# Patient Record
Sex: FEMALE | Race: WHITE | NOT HISPANIC OR LATINO
[De-identification: names, ages, dates, MRNs, and addresses within clinical notes are randomized per-mention and may not be internally consistent; named-entity substitution may affect disease eponyms.]

---

## 2021-03-15 ENCOUNTER — TRANSCRIPTION ENCOUNTER (OUTPATIENT)
Age: 31
End: 2021-03-15

## 2021-03-19 ENCOUNTER — TRANSCRIPTION ENCOUNTER (OUTPATIENT)
Age: 31
End: 2021-03-19

## 2023-07-13 ENCOUNTER — TRANSCRIPTION ENCOUNTER (OUTPATIENT)
Age: 33
End: 2023-07-13

## 2023-07-13 ENCOUNTER — INPATIENT (INPATIENT)
Facility: HOSPITAL | Age: 33
LOS: 11 days | Discharge: HOME | End: 2023-07-25
Attending: OBSTETRICS & GYNECOLOGY | Admitting: OBSTETRICS & GYNECOLOGY
Payer: COMMERCIAL

## 2023-07-13 VITALS — WEIGHT: 225.09 LBS | HEIGHT: 65 IN

## 2023-07-13 DIAGNOSIS — Z3A.00 WEEKS OF GESTATION OF PREGNANCY NOT SPECIFIED: ICD-10-CM

## 2023-07-13 DIAGNOSIS — O26.899 OTHER SPECIFIED PREGNANCY RELATED CONDITIONS, UNSPECIFIED TRIMESTER: ICD-10-CM

## 2023-07-13 LAB
ALBUMIN SERPL ELPH-MCNC: 3.6 G/DL — SIGNIFICANT CHANGE UP (ref 3.3–5)
ALP SERPL-CCNC: 95 U/L — SIGNIFICANT CHANGE UP (ref 40–120)
ALT FLD-CCNC: 17 U/L — SIGNIFICANT CHANGE UP (ref 10–45)
ANION GAP SERPL CALC-SCNC: 12 MMOL/L — SIGNIFICANT CHANGE UP (ref 5–17)
APTT BLD: 25.6 SEC — LOW (ref 27.5–35.5)
AST SERPL-CCNC: 15 U/L — SIGNIFICANT CHANGE UP (ref 10–40)
BASOPHILS # BLD AUTO: 0.04 K/UL — SIGNIFICANT CHANGE UP (ref 0–0.2)
BASOPHILS NFR BLD AUTO: 0.4 % — SIGNIFICANT CHANGE UP (ref 0–2)
BILIRUB SERPL-MCNC: 0.2 MG/DL — SIGNIFICANT CHANGE UP (ref 0.2–1.2)
BLD GP AB SCN SERPL QL: NEGATIVE — SIGNIFICANT CHANGE UP
BUN SERPL-MCNC: 15 MG/DL — SIGNIFICANT CHANGE UP (ref 7–23)
CALCIUM SERPL-MCNC: 8.9 MG/DL — SIGNIFICANT CHANGE UP (ref 8.4–10.5)
CHLORIDE SERPL-SCNC: 107 MMOL/L — SIGNIFICANT CHANGE UP (ref 96–108)
CO2 SERPL-SCNC: 20 MMOL/L — LOW (ref 22–31)
CREAT ?TM UR-MCNC: 141 MG/DL — SIGNIFICANT CHANGE UP
CREAT SERPL-MCNC: 0.54 MG/DL — SIGNIFICANT CHANGE UP (ref 0.5–1.3)
EGFR: 125 ML/MIN/1.73M2 — SIGNIFICANT CHANGE UP
EOSINOPHIL # BLD AUTO: 0.06 K/UL — SIGNIFICANT CHANGE UP (ref 0–0.5)
EOSINOPHIL NFR BLD AUTO: 0.5 % — SIGNIFICANT CHANGE UP (ref 0–6)
FIBRINOGEN PPP-MCNC: 502 MG/DL — HIGH (ref 200–445)
GLUCOSE SERPL-MCNC: 84 MG/DL — SIGNIFICANT CHANGE UP (ref 70–99)
HCT VFR BLD CALC: 35.4 % — SIGNIFICANT CHANGE UP (ref 34.5–45)
HGB BLD-MCNC: 11.9 G/DL — SIGNIFICANT CHANGE UP (ref 11.5–15.5)
IMM GRANULOCYTES NFR BLD AUTO: 0.7 % — SIGNIFICANT CHANGE UP (ref 0–0.9)
INR BLD: 0.88 — SIGNIFICANT CHANGE UP (ref 0.88–1.16)
LDH SERPL L TO P-CCNC: 174 U/L — SIGNIFICANT CHANGE UP (ref 50–242)
LYMPHOCYTES # BLD AUTO: 1.41 K/UL — SIGNIFICANT CHANGE UP (ref 1–3.3)
LYMPHOCYTES # BLD AUTO: 12.5 % — LOW (ref 13–44)
MCHC RBC-ENTMCNC: 29.7 PG — SIGNIFICANT CHANGE UP (ref 27–34)
MCHC RBC-ENTMCNC: 33.6 GM/DL — SIGNIFICANT CHANGE UP (ref 32–36)
MCV RBC AUTO: 88.3 FL — SIGNIFICANT CHANGE UP (ref 80–100)
MONOCYTES # BLD AUTO: 0.66 K/UL — SIGNIFICANT CHANGE UP (ref 0–0.9)
MONOCYTES NFR BLD AUTO: 5.9 % — SIGNIFICANT CHANGE UP (ref 2–14)
NEUTROPHILS # BLD AUTO: 9.02 K/UL — HIGH (ref 1.8–7.4)
NEUTROPHILS NFR BLD AUTO: 80 % — HIGH (ref 43–77)
NRBC # BLD: 0 /100 WBCS — SIGNIFICANT CHANGE UP (ref 0–0)
PLATELET # BLD AUTO: 225 K/UL — SIGNIFICANT CHANGE UP (ref 150–400)
POTASSIUM SERPL-MCNC: 4 MMOL/L — SIGNIFICANT CHANGE UP (ref 3.5–5.3)
POTASSIUM SERPL-SCNC: 4 MMOL/L — SIGNIFICANT CHANGE UP (ref 3.5–5.3)
PROT ?TM UR-MCNC: 20 MG/DL — HIGH (ref 0–12)
PROT SERPL-MCNC: 6.9 G/DL — SIGNIFICANT CHANGE UP (ref 6–8.3)
PROT/CREAT UR-RTO: 0.1 RATIO — SIGNIFICANT CHANGE UP (ref 0–0.2)
PROTHROM AB SERPL-ACNC: 10.5 SEC — SIGNIFICANT CHANGE UP (ref 10.5–13.4)
RBC # BLD: 4.01 M/UL — SIGNIFICANT CHANGE UP (ref 3.8–5.2)
RBC # FLD: 13.4 % — SIGNIFICANT CHANGE UP (ref 10.3–14.5)
RH IG SCN BLD-IMP: POSITIVE — SIGNIFICANT CHANGE UP
RH IG SCN BLD-IMP: POSITIVE — SIGNIFICANT CHANGE UP
SODIUM SERPL-SCNC: 139 MMOL/L — SIGNIFICANT CHANGE UP (ref 135–145)
URATE SERPL-MCNC: 4.8 MG/DL — SIGNIFICANT CHANGE UP (ref 2.5–7)
WBC # BLD: 11.27 K/UL — HIGH (ref 3.8–10.5)
WBC # FLD AUTO: 11.27 K/UL — HIGH (ref 3.8–10.5)

## 2023-07-13 RX ORDER — OXYTOCIN 10 UNIT/ML
2 VIAL (ML) INJECTION
Qty: 30 | Refills: 0 | Status: DISCONTINUED | OUTPATIENT
Start: 2023-07-13 | End: 2023-07-14

## 2023-07-13 RX ORDER — CITRIC ACID/SODIUM CITRATE 300-500 MG
15 SOLUTION, ORAL ORAL EVERY 6 HOURS
Refills: 0 | Status: DISCONTINUED | OUTPATIENT
Start: 2023-07-13 | End: 2023-07-14

## 2023-07-13 RX ORDER — CHLORHEXIDINE GLUCONATE 213 G/1000ML
1 SOLUTION TOPICAL DAILY
Refills: 0 | Status: DISCONTINUED | OUTPATIENT
Start: 2023-07-13 | End: 2023-07-14

## 2023-07-13 RX ORDER — OXYTOCIN 10 UNIT/ML
333.33 VIAL (ML) INJECTION
Qty: 20 | Refills: 0 | Status: DISCONTINUED | OUTPATIENT
Start: 2023-07-13 | End: 2023-07-14

## 2023-07-13 RX ORDER — SODIUM CHLORIDE 9 MG/ML
1000 INJECTION, SOLUTION INTRAVENOUS
Refills: 0 | Status: DISCONTINUED | OUTPATIENT
Start: 2023-07-13 | End: 2023-07-14

## 2023-07-13 RX ORDER — FENTANYL/BUPIVACAINE/NS/PF 2MCG/ML-.1
250 PLASTIC BAG, INJECTION (ML) INJECTION
Refills: 0 | Status: DISCONTINUED | OUTPATIENT
Start: 2023-07-13 | End: 2023-07-14

## 2023-07-13 RX ADMIN — SODIUM CHLORIDE 125 MILLILITER(S): 9 INJECTION, SOLUTION INTRAVENOUS at 23:00

## 2023-07-13 RX ADMIN — Medication 2 MILLIUNIT(S)/MIN: at 19:00

## 2023-07-13 RX ADMIN — SODIUM CHLORIDE 125 MILLILITER(S): 9 INJECTION, SOLUTION INTRAVENOUS at 17:54

## 2023-07-13 NOTE — PATIENT PROFILE OB - FUNCTIONAL SCREEN CURRENT LEVEL: COMMUNICATION, MLM
no abdominal pain, no bloating, no constipation, no diarrhea, no nausea and no vomiting. 0 = understands/communicates without difficulty

## 2023-07-13 NOTE — PRE-ANESTHESIA EVALUATION ADULT - NSANTHPMHFT_GEN_ALL_CORE
METS>4  33yo  at 38+4 with cHTN presenting for elevated BP at home (167/117), r/o superimposed preeclampsia. She had elevated BP (157/101) at 16wks, ruling in for chronic hypertension.

## 2023-07-13 NOTE — PATIENT PROFILE OB - FALL HARM RISK - UNIVERSAL INTERVENTIONS
Bed in lowest position, wheels locked, appropriate side rails in place/Call bell, personal items and telephone in reach/Instruct patient to call for assistance before getting out of bed or chair/Non-slip footwear when patient is out of bed/[place] to call system/Physically safe environment - no spills, clutter or unnecessary equipment/Purposeful Proactive Rounding/Room/bathroom lighting operational, light cord in reach

## 2023-07-13 NOTE — PATIENT PROFILE OB - PHONE #
OK to refill. PDMP reviewed; no aberrant behavior identified, prescription authorized and ePrescribed.     [phone]

## 2023-07-14 LAB — T PALLIDUM AB TITR SER: NEGATIVE — SIGNIFICANT CHANGE UP

## 2023-07-14 PROCEDURE — 88307 TISSUE EXAM BY PATHOLOGIST: CPT | Mod: 26

## 2023-07-14 RX ORDER — SIMETHICONE 80 MG/1
80 TABLET, CHEWABLE ORAL EVERY 4 HOURS
Refills: 0 | Status: DISCONTINUED | OUTPATIENT
Start: 2023-07-14 | End: 2023-07-25

## 2023-07-14 RX ORDER — GENTAMICIN SULFATE 40 MG/ML
380 VIAL (ML) INJECTION ONCE
Refills: 0 | Status: COMPLETED | OUTPATIENT
Start: 2023-07-14 | End: 2023-07-14

## 2023-07-14 RX ORDER — KETOROLAC TROMETHAMINE 30 MG/ML
30 SYRINGE (ML) INJECTION EVERY 6 HOURS
Refills: 0 | Status: DISCONTINUED | OUTPATIENT
Start: 2023-07-14 | End: 2023-07-15

## 2023-07-14 RX ORDER — DIPHENHYDRAMINE HCL 50 MG
25 CAPSULE ORAL EVERY 6 HOURS
Refills: 0 | Status: DISCONTINUED | OUTPATIENT
Start: 2023-07-14 | End: 2023-07-25

## 2023-07-14 RX ORDER — AMPICILLIN TRIHYDRATE 250 MG
2 CAPSULE ORAL EVERY 6 HOURS
Refills: 0 | Status: COMPLETED | OUTPATIENT
Start: 2023-07-14 | End: 2023-07-15

## 2023-07-14 RX ORDER — GENTAMICIN SULFATE 40 MG/ML
380 VIAL (ML) INJECTION ONCE
Refills: 0 | Status: DISCONTINUED | OUTPATIENT
Start: 2023-07-14 | End: 2023-07-14

## 2023-07-14 RX ORDER — MAGNESIUM HYDROXIDE 400 MG/1
30 TABLET, CHEWABLE ORAL
Refills: 0 | Status: DISCONTINUED | OUTPATIENT
Start: 2023-07-14 | End: 2023-07-25

## 2023-07-14 RX ORDER — AMPICILLIN TRIHYDRATE 250 MG
2 CAPSULE ORAL EVERY 6 HOURS
Refills: 0 | Status: DISCONTINUED | OUTPATIENT
Start: 2023-07-14 | End: 2023-07-14

## 2023-07-14 RX ORDER — OXYCODONE HYDROCHLORIDE 5 MG/1
5 TABLET ORAL ONCE
Refills: 0 | Status: DISCONTINUED | OUTPATIENT
Start: 2023-07-14 | End: 2023-07-21

## 2023-07-14 RX ORDER — LANOLIN
1 OINTMENT (GRAM) TOPICAL EVERY 6 HOURS
Refills: 0 | Status: DISCONTINUED | OUTPATIENT
Start: 2023-07-14 | End: 2023-07-25

## 2023-07-14 RX ORDER — ACETAMINOPHEN 500 MG
975 TABLET ORAL
Refills: 0 | Status: DISCONTINUED | OUTPATIENT
Start: 2023-07-14 | End: 2023-07-25

## 2023-07-14 RX ORDER — IBUPROFEN 200 MG
600 TABLET ORAL EVERY 6 HOURS
Refills: 0 | Status: COMPLETED | OUTPATIENT
Start: 2023-07-14 | End: 2024-06-11

## 2023-07-14 RX ORDER — TETANUS TOXOID, REDUCED DIPHTHERIA TOXOID AND ACELLULAR PERTUSSIS VACCINE, ADSORBED 5; 2.5; 8; 8; 2.5 [IU]/.5ML; [IU]/.5ML; UG/.5ML; UG/.5ML; UG/.5ML
0.5 SUSPENSION INTRAMUSCULAR ONCE
Refills: 0 | Status: COMPLETED | OUTPATIENT
Start: 2023-07-14

## 2023-07-14 RX ORDER — OXYCODONE HYDROCHLORIDE 5 MG/1
5 TABLET ORAL
Refills: 0 | Status: DISCONTINUED | OUTPATIENT
Start: 2023-07-14 | End: 2023-07-21

## 2023-07-14 RX ORDER — ENOXAPARIN SODIUM 100 MG/ML
40 INJECTION SUBCUTANEOUS EVERY 24 HOURS
Refills: 0 | Status: DISCONTINUED | OUTPATIENT
Start: 2023-07-15 | End: 2023-07-25

## 2023-07-14 RX ORDER — CITRIC ACID/SODIUM CITRATE 300-500 MG
30 SOLUTION, ORAL ORAL ONCE
Refills: 0 | Status: DISCONTINUED | OUTPATIENT
Start: 2023-07-14 | End: 2023-07-14

## 2023-07-14 RX ORDER — ACETAMINOPHEN 500 MG
1000 TABLET ORAL ONCE
Refills: 0 | Status: COMPLETED | OUTPATIENT
Start: 2023-07-14 | End: 2023-07-14

## 2023-07-14 RX ORDER — SODIUM CHLORIDE 9 MG/ML
1000 INJECTION, SOLUTION INTRAVENOUS
Refills: 0 | Status: DISCONTINUED | OUTPATIENT
Start: 2023-07-14 | End: 2023-07-24

## 2023-07-14 RX ORDER — OXYTOCIN 10 UNIT/ML
333.33 VIAL (ML) INJECTION
Qty: 20 | Refills: 0 | Status: DISCONTINUED | OUTPATIENT
Start: 2023-07-14 | End: 2023-07-25

## 2023-07-14 RX ORDER — SODIUM CHLORIDE 9 MG/ML
500 INJECTION, SOLUTION INTRAVENOUS ONCE
Refills: 0 | Status: COMPLETED | OUTPATIENT
Start: 2023-07-14 | End: 2023-07-14

## 2023-07-14 RX ADMIN — Medication 100 MILLIGRAM(S): at 23:48

## 2023-07-14 RX ADMIN — Medication 1000 MILLIGRAM(S): at 15:22

## 2023-07-14 RX ADMIN — Medication 30 MILLIGRAM(S): at 20:17

## 2023-07-14 RX ADMIN — Medication 200 MILLIGRAM(S): at 17:40

## 2023-07-14 RX ADMIN — Medication 30 MILLIGRAM(S): at 19:26

## 2023-07-14 RX ADMIN — Medication 400 MILLIGRAM(S): at 14:37

## 2023-07-14 RX ADMIN — Medication 975 MILLIGRAM(S): at 21:20

## 2023-07-14 RX ADMIN — SODIUM CHLORIDE 125 MILLILITER(S): 9 INJECTION, SOLUTION INTRAVENOUS at 07:48

## 2023-07-14 RX ADMIN — Medication 975 MILLIGRAM(S): at 22:00

## 2023-07-14 RX ADMIN — SODIUM CHLORIDE 1000 MILLILITER(S): 9 INJECTION, SOLUTION INTRAVENOUS at 21:25

## 2023-07-14 RX ADMIN — Medication 100 MILLIGRAM(S): at 17:50

## 2023-07-14 RX ADMIN — Medication 216 GRAM(S): at 17:00

## 2023-07-15 LAB
BASOPHILS # BLD AUTO: 0.05 K/UL — SIGNIFICANT CHANGE UP (ref 0–0.2)
BASOPHILS NFR BLD AUTO: 0.3 % — SIGNIFICANT CHANGE UP (ref 0–2)
EOSINOPHIL # BLD AUTO: 0.1 K/UL — SIGNIFICANT CHANGE UP (ref 0–0.5)
EOSINOPHIL NFR BLD AUTO: 0.6 % — SIGNIFICANT CHANGE UP (ref 0–6)
HCT VFR BLD CALC: 28 % — LOW (ref 34.5–45)
HGB BLD-MCNC: 9.6 G/DL — LOW (ref 11.5–15.5)
IMM GRANULOCYTES NFR BLD AUTO: 0.5 % — SIGNIFICANT CHANGE UP (ref 0–0.9)
LYMPHOCYTES # BLD AUTO: 1.65 K/UL — SIGNIFICANT CHANGE UP (ref 1–3.3)
LYMPHOCYTES # BLD AUTO: 10.6 % — LOW (ref 13–44)
MCHC RBC-ENTMCNC: 31 PG — SIGNIFICANT CHANGE UP (ref 27–34)
MCHC RBC-ENTMCNC: 34.3 GM/DL — SIGNIFICANT CHANGE UP (ref 32–36)
MCV RBC AUTO: 90.3 FL — SIGNIFICANT CHANGE UP (ref 80–100)
MONOCYTES # BLD AUTO: 0.97 K/UL — HIGH (ref 0–0.9)
MONOCYTES NFR BLD AUTO: 6.2 % — SIGNIFICANT CHANGE UP (ref 2–14)
NEUTROPHILS # BLD AUTO: 12.71 K/UL — HIGH (ref 1.8–7.4)
NEUTROPHILS NFR BLD AUTO: 81.8 % — HIGH (ref 43–77)
NRBC # BLD: 0 /100 WBCS — SIGNIFICANT CHANGE UP (ref 0–0)
PLATELET # BLD AUTO: 162 K/UL — SIGNIFICANT CHANGE UP (ref 150–400)
RBC # BLD: 3.1 M/UL — LOW (ref 3.8–5.2)
RBC # FLD: 13.6 % — SIGNIFICANT CHANGE UP (ref 10.3–14.5)
WBC # BLD: 15.55 K/UL — HIGH (ref 3.8–10.5)
WBC # FLD AUTO: 15.55 K/UL — HIGH (ref 3.8–10.5)

## 2023-07-15 RX ORDER — IBUPROFEN 200 MG
600 TABLET ORAL EVERY 6 HOURS
Refills: 0 | Status: DISCONTINUED | OUTPATIENT
Start: 2023-07-15 | End: 2023-07-25

## 2023-07-15 RX ADMIN — Medication 30 MILLIGRAM(S): at 12:55

## 2023-07-15 RX ADMIN — Medication 30 MILLIGRAM(S): at 00:10

## 2023-07-15 RX ADMIN — Medication 975 MILLIGRAM(S): at 20:31

## 2023-07-15 RX ADMIN — Medication 975 MILLIGRAM(S): at 14:54

## 2023-07-15 RX ADMIN — Medication 30 MILLIGRAM(S): at 06:20

## 2023-07-15 RX ADMIN — Medication 975 MILLIGRAM(S): at 10:35

## 2023-07-15 RX ADMIN — Medication 100 MILLIGRAM(S): at 06:10

## 2023-07-15 RX ADMIN — ENOXAPARIN SODIUM 40 MILLIGRAM(S): 100 INJECTION SUBCUTANEOUS at 09:56

## 2023-07-15 RX ADMIN — Medication 216 GRAM(S): at 12:54

## 2023-07-15 RX ADMIN — Medication 30 MILLIGRAM(S): at 05:48

## 2023-07-15 RX ADMIN — Medication 216 GRAM(S): at 20:02

## 2023-07-15 RX ADMIN — Medication 216 GRAM(S): at 01:03

## 2023-07-15 RX ADMIN — Medication 975 MILLIGRAM(S): at 15:45

## 2023-07-15 RX ADMIN — Medication 30 MILLIGRAM(S): at 13:30

## 2023-07-15 RX ADMIN — Medication 975 MILLIGRAM(S): at 09:56

## 2023-07-15 RX ADMIN — Medication 975 MILLIGRAM(S): at 03:32

## 2023-07-15 RX ADMIN — Medication 30 MILLIGRAM(S): at 00:30

## 2023-07-15 RX ADMIN — Medication 600 MILLIGRAM(S): at 18:30

## 2023-07-15 RX ADMIN — Medication 975 MILLIGRAM(S): at 03:02

## 2023-07-15 RX ADMIN — SIMETHICONE 80 MILLIGRAM(S): 80 TABLET, CHEWABLE ORAL at 15:17

## 2023-07-15 RX ADMIN — Medication 600 MILLIGRAM(S): at 17:56

## 2023-07-15 RX ADMIN — Medication 975 MILLIGRAM(S): at 21:00

## 2023-07-15 RX ADMIN — Medication 216 GRAM(S): at 07:24

## 2023-07-15 RX ADMIN — Medication 100 MILLIGRAM(S): at 14:54

## 2023-07-15 NOTE — PROGRESS NOTE ADULT - ASSESSMENT
32y Female POD#1  s/p C/S, Uncomplicated                                       - Neuro/Pain:  toradol atc, tylenol atc, oxy prn  - CV:  VS per routine, AM CBC pending  - Pulm: Encourage ISS & Ambulation  - GI: Advance as tolerated  - : Lazo in place, to be removed this morning, TOV this afternoon  - DVT ppx: SCDs, Lovenox 40mg QD  - Dispo: POD #2/3

## 2023-07-15 NOTE — PROGRESS NOTE ADULT - SUBJECTIVE AND OBJECTIVE BOX
Patient evaluated at bedside this morning, resting comfortable in bed, with no acute events overnight.  She reports pain is well controlled with oral pain medications.   She denies headache, dizziness, chest pain, palpitations, shortness of breath, nausea, vomiting or heavy vaginal bleeding.  She has not tried ambulating since procedure, hdez remains in place at this time. Tolerating clear liquids.     Physical Exam:  Vital Signs Last 24 Hrs  T(C): 36.9 (15 Jul 2023 05:59), Max: 37.3 (14 Jul 2023 20:17)  T(F): 98.5 (15 Jul 2023 05:59), Max: 99.1 (14 Jul 2023 20:17)  HR: 82 (15 Jul 2023 05:59) (76 - 104)  BP: 120/85 (15 Jul 2023 05:59) (102/51 - 141/90)  BP(mean): 98 (14 Jul 2023 22:17) (73 - 98)  RR: 16 (15 Jul 2023 05:59) (15 - 18)  SpO2: 97% (15 Jul 2023 05:59) (94% - 100%)    Parameters below as of 15 Jul 2023 02:00  Patient On (Oxygen Delivery Method): room air        GA: NAD, A+0 x 3  Pulm: no increased WOB  Abd: soft, nontender, nondistended, no rebound or guarding, incision clean, dry and intact, uterus firm at midline, 2 fb below umbilicus  : hdez in situ, lochia WNL  Extremities: no swelling or calf tenderness, SCDs in place                            9.6    15.55 )-----------( 162      ( 15 Jul 2023 05:30 )             28.0     07-13    139  |  107  |  15  ----------------------------<  84  4.0   |  20<L>  |  0.54    Ca    8.9      13 Jul 2023 14:34    TPro  6.9  /  Alb  3.6  /  TBili  0.2  /  DBili  x   /  AST  15  /  ALT  17  /  AlkPhos  95  07-13      PT/INR - ( 13 Jul 2023 14:34 )   PT: 10.5 sec;   INR: 0.88          PTT - ( 13 Jul 2023 14:34 )  PTT:25.6 sec  acetaminophen     Tablet .. 975 milliGRAM(s) Oral <User Schedule>  ampicillin  IVPB 2 Gram(s) IV Intermittent every 6 hours  clindamycin IVPB 900 milliGRAM(s) IV Intermittent every 8 hours  diphenhydrAMINE 25 milliGRAM(s) Oral every 6 hours PRN  diphtheria/tetanus/pertussis (acellular) Vaccine (Adacel) 0.5 milliLiter(s) IntraMuscular once  enoxaparin Injectable 40 milliGRAM(s) SubCutaneous every 24 hours  ibuprofen  Tablet. 600 milliGRAM(s) Oral every 6 hours  ketorolac   Injectable 30 milliGRAM(s) IV Push every 6 hours  lactated ringers. 1000 milliLiter(s) IV Continuous <Continuous>  lanolin Ointment 1 Application(s) Topical every 6 hours PRN  magnesium hydroxide Suspension 30 milliLiter(s) Oral two times a day PRN  oxyCODONE    IR 5 milliGRAM(s) Oral every 3 hours PRN  oxyCODONE    IR 5 milliGRAM(s) Oral once PRN  oxytocin Infusion 333.333 milliUNIT(s)/Min IV Continuous <Continuous>  simethicone 80 milliGRAM(s) Chew every 4 hours PRN

## 2023-07-16 RX ORDER — NIFEDIPINE 30 MG
30 TABLET, EXTENDED RELEASE 24 HR ORAL DAILY
Refills: 0 | Status: DISCONTINUED | OUTPATIENT
Start: 2023-07-16 | End: 2023-07-17

## 2023-07-16 RX ADMIN — SIMETHICONE 80 MILLIGRAM(S): 80 TABLET, CHEWABLE ORAL at 09:34

## 2023-07-16 RX ADMIN — MAGNESIUM HYDROXIDE 30 MILLILITER(S): 400 TABLET, CHEWABLE ORAL at 15:19

## 2023-07-16 RX ADMIN — Medication 975 MILLIGRAM(S): at 16:10

## 2023-07-16 RX ADMIN — Medication 600 MILLIGRAM(S): at 06:35

## 2023-07-16 RX ADMIN — Medication 600 MILLIGRAM(S): at 18:13

## 2023-07-16 RX ADMIN — Medication 600 MILLIGRAM(S): at 13:11

## 2023-07-16 RX ADMIN — SIMETHICONE 80 MILLIGRAM(S): 80 TABLET, CHEWABLE ORAL at 15:19

## 2023-07-16 RX ADMIN — Medication 600 MILLIGRAM(S): at 19:02

## 2023-07-16 RX ADMIN — Medication 975 MILLIGRAM(S): at 15:19

## 2023-07-16 RX ADMIN — Medication 975 MILLIGRAM(S): at 10:30

## 2023-07-16 RX ADMIN — SIMETHICONE 80 MILLIGRAM(S): 80 TABLET, CHEWABLE ORAL at 20:47

## 2023-07-16 RX ADMIN — Medication 975 MILLIGRAM(S): at 02:42

## 2023-07-16 RX ADMIN — Medication 975 MILLIGRAM(S): at 09:34

## 2023-07-16 RX ADMIN — Medication 975 MILLIGRAM(S): at 03:12

## 2023-07-16 RX ADMIN — Medication 600 MILLIGRAM(S): at 12:17

## 2023-07-16 RX ADMIN — Medication 600 MILLIGRAM(S): at 00:30

## 2023-07-16 RX ADMIN — SIMETHICONE 80 MILLIGRAM(S): 80 TABLET, CHEWABLE ORAL at 00:00

## 2023-07-16 RX ADMIN — ENOXAPARIN SODIUM 40 MILLIGRAM(S): 100 INJECTION SUBCUTANEOUS at 09:34

## 2023-07-16 RX ADMIN — Medication 600 MILLIGRAM(S): at 23:48

## 2023-07-16 RX ADMIN — Medication 600 MILLIGRAM(S): at 06:04

## 2023-07-16 RX ADMIN — Medication 975 MILLIGRAM(S): at 21:20

## 2023-07-16 RX ADMIN — Medication 600 MILLIGRAM(S): at 00:00

## 2023-07-16 RX ADMIN — Medication 975 MILLIGRAM(S): at 20:47

## 2023-07-16 RX ADMIN — Medication 30 MILLIGRAM(S): at 18:13

## 2023-07-16 NOTE — PROGRESS NOTE ADULT - SUBJECTIVE AND OBJECTIVE BOX
Patient evaluated at bedside.   She reports pain is well controlled with OPM.  She has been ambulating without assistance, voiding spontaneously, passing gas, tolerating regular diet.  She denies HA, dizziness, CP, palpitations, SOB, RUQ/epigastric pain, n/v, or heavy vaginal bleeding.    Physical Exam:  Vital Signs Last 24 Hrs  T(C): 36.9 (16 Jul 2023 02:26), Max: 36.9 (15 Jul 2023 05:59)  T(F): 98.4 (16 Jul 2023 02:26), Max: 98.5 (15 Jul 2023 05:59)  HR: 81 (16 Jul 2023 02:26) (81 - 93)  BP: 130/84 (16 Jul 2023 02:26) (110/70 - 132/86)  BP(mean): --  RR: 18 (16 Jul 2023 02:26) (16 - 18)  SpO2: 99% (16 Jul 2023 02:26) (97% - 99%)    Parameters below as of 16 Jul 2023 02:26  Patient On (Oxygen Delivery Method): room air        Gen: NAD  Abd: + BS, soft, nontender, nondistended, no rebound or guarding  Incision clean, dry and intact  uterus firm at midline  No RUQ/epigastric tenderness  : lochia WNL  Extremities: no swelling or calf tenderness                          9.6    15.55 )-----------( 162      ( 15 Jul 2023 05:30 )             28.0

## 2023-07-16 NOTE — LACTATION INITIAL EVALUATION - NS LACT CON REASON FOR REQ
38 wk baby seen on dol#2, s/p brief nicu stay, voiding and stooling, mainly formula fed thus far. Mother wishes to breastfeed and reports a recent 15min feed prior to my visit to room. Baby alert and rooting and placed back on breast. Positioning and latch strategies taught using cradle hold. Baby attains a deep, sustained latch, but quickly became sleepy, taking short suck bursts between rest pauses with stim. Pump at bedside and instructions for use taught if pump needed and/ or bottle given for any reason. Complete breastfeeding education provided along with hand expression technique. Colostrum expressible in small amounts bilat. Responsive frequent feeds, increased SSC and rooming-in all advised. All questions were answered, mother verbalized understanding of teaching and info given. Referral for lactation telehealth was placed for further support s/p d/c./primaparous mom

## 2023-07-16 NOTE — CHART NOTE - NSCHARTNOTEFT_GEN_A_CORE
Nurse called for increased bleeding at incision site and requested evaluation. Patient was seen and examined at bedside, breastfeeding, in stable condition. She denies chest pain, palpitations, lightheadedness, abdominal pain.     PE  vitals: 154/87, HR 85, spo2 97%, temp 98.5  gen: well appearing, breastfeeding, in NAD  abd: soft, minimally tender. Incision with steristrips in place: clean/dry/ intact with one steri strip 50% saturated with blood.   : minimal blood on pad  extrem: minimal LE edema     A&P: On physical exam there is no active bleeding at incision site, only one steri strip saturated with blood. Vitals stable, minimal abdominal tenderness, overall reassuring.  - continue to monitor

## 2023-07-16 NOTE — LACTATION INITIAL EVALUATION - LACTATION INTERVENTIONS
initiate/review safe skin-to-skin/initiate/review hand expression/initiate/review pumping guidelines and safe milk handling/initiate/review techniques for position and latch/post discharge community resources provided/reviewed components of an effective feeding and at least 8 effective feedings per day required/reviewed importance of monitoring infant diapers, the breastfeeding log, and minimum output each day/reviewed risks of unnecessary formula supplementation/reviewed risks of artificial nipples/reviewed benefits and recommendations for rooming in/reviewed feeding on demand/by cue at least 8 times a day/reviewed indications of inadequate milk transfer that would require supplementation

## 2023-07-16 NOTE — PROGRESS NOTE ADULT - ASSESSMENT
32y Female POD#2 s/p C/S, c/b cHTN and chorioamnionitis  - cHTN: normotensive overnight, denies toxic complaints    - chorioamnionitis, s/p IV A/G/C                      - Neuro/Pain: motrin atc, tylenol atc, oxy prn  - CV: VSq4h  - Pulm: Encourage ISS & Ambulation  - GI: Advance as tolerated  - : Voiding spontaneously  - DVT ppx: SCDs, Lovenox 40mg QD  - Dispo: POD #2/3

## 2023-07-17 RX ORDER — LABETALOL HCL 100 MG
200 TABLET ORAL
Refills: 0 | Status: DISCONTINUED | OUTPATIENT
Start: 2023-07-17 | End: 2023-07-18

## 2023-07-17 RX ORDER — NIFEDIPINE 30 MG
30 TABLET, EXTENDED RELEASE 24 HR ORAL EVERY 12 HOURS
Refills: 0 | Status: DISCONTINUED | OUTPATIENT
Start: 2023-07-17 | End: 2023-07-18

## 2023-07-17 RX ADMIN — Medication 975 MILLIGRAM(S): at 09:39

## 2023-07-17 RX ADMIN — Medication 30 MILLIGRAM(S): at 17:54

## 2023-07-17 RX ADMIN — Medication 975 MILLIGRAM(S): at 21:20

## 2023-07-17 RX ADMIN — Medication 200 MILLIGRAM(S): at 11:24

## 2023-07-17 RX ADMIN — Medication 600 MILLIGRAM(S): at 17:54

## 2023-07-17 RX ADMIN — Medication 600 MILLIGRAM(S): at 11:57

## 2023-07-17 RX ADMIN — Medication 600 MILLIGRAM(S): at 05:51

## 2023-07-17 RX ADMIN — Medication 975 MILLIGRAM(S): at 22:20

## 2023-07-17 RX ADMIN — Medication 975 MILLIGRAM(S): at 03:35

## 2023-07-17 RX ADMIN — Medication 30 MILLIGRAM(S): at 05:51

## 2023-07-17 RX ADMIN — SIMETHICONE 80 MILLIGRAM(S): 80 TABLET, CHEWABLE ORAL at 11:03

## 2023-07-17 RX ADMIN — Medication 975 MILLIGRAM(S): at 15:15

## 2023-07-17 RX ADMIN — Medication 600 MILLIGRAM(S): at 06:21

## 2023-07-17 RX ADMIN — Medication 600 MILLIGRAM(S): at 00:20

## 2023-07-17 RX ADMIN — Medication 975 MILLIGRAM(S): at 03:02

## 2023-07-17 RX ADMIN — Medication 600 MILLIGRAM(S): at 23:50

## 2023-07-17 RX ADMIN — ENOXAPARIN SODIUM 40 MILLIGRAM(S): 100 INJECTION SUBCUTANEOUS at 11:57

## 2023-07-17 RX ADMIN — Medication 200 MILLIGRAM(S): at 22:26

## 2023-07-17 NOTE — CHART NOTE - NSCHARTNOTEFT_GEN_A_CORE
Nurse alerted team to severe range /104  at 10:30am, repeat 15min later 147/95. She was started on nifedipine 30bid yesterday evening due to persistent mild range blood pressures and received her dose this AM at 6am. Patient was evaluated at bedside. She reports being incredibly anxious seeing that her blood pressure is elevated and feels her heart is racing a little bit due to her anxiety. She denies HA, difficulty breathing, chest pain epigastric/ RUQ pain.     PE:   vitals (above)   gen: anxious-appearing, breastfeeding infant in bed  pulm: no increased WOB, CTAB  CV: tachycardic, regular rate, no murmurs  abd: soft, nontender, fundus firm, mild tenderness over incision site.   extrem: moderate non-pitting edema of LE bilaterally     31yo  POD3 s/p pCS after failed IOL for cHTN. Blood pressure is still persistently mild range now with one severe range, will add labetalol for better BP control.   - continue nifedipine 30mg bid  - start labetalol 200mg bid  - continues to deny toxic symptoms of PEC  - continue to monitor vitals q4hr

## 2023-07-17 NOTE — PROVIDER CONTACT NOTE (OTHER) - BACKGROUND
Pt is s/p  day 3. Pt has hx of GHTN and has been increasing in elevated BP throughout the night. Previous BP was 155/97 for 0200 routine VS. Pt was increased Nifedipine 30 mg po BID.

## 2023-07-17 NOTE — PROGRESS NOTE ADULT - ASSESSMENT
32y Female POD#3 s/p C/S, c/b cHTN and chorioamnionitis  - cHTN: 1x severe range BP overnight, Nifedipine 30mg BID started. Denies all toxic complaints.   - Chorioamnionitis: s/p IV A/G/C 7/14-15  - Neuro/Pain: motrin atc, tylenol atc, oxy prn  - CV:  VSq4h  - Pulm: Encourage ISS & Ambulation  - GI: Advance as tolerated  - : Voiding spontaneously  - DVT ppx: SCDs, Lovenox 40mg QD  - Dispo: when BP well controlled

## 2023-07-17 NOTE — PROGRESS NOTE ADULT - SUBJECTIVE AND OBJECTIVE BOX
Patient evaluated at bedside.   She reports pain is well controlled with OPM.  She has been ambulating without assistance, voiding spontaneously, passing gas, tolerating regular diet.  She denies HA, dizziness, CP, palpitations, SOB, RUQ/epigastric pain, n/v, or heavy vaginal bleeding.    Physical Exam:  Vital Signs Last 24 Hrs  T(C): 36.7 (17 Jul 2023 05:37), Max: 36.9 (16 Jul 2023 10:00)  T(F): 98.1 (17 Jul 2023 05:37), Max: 98.4 (16 Jul 2023 10:00)  HR: 103 (17 Jul 2023 05:52) (81 - 103)  BP: 155/96 (17 Jul 2023 05:52) (138/89 - 163/105)  BP(mean): --  RR: 16 (17 Jul 2023 05:52) (16 - 18)  SpO2: 99% (17 Jul 2023 05:52) (97% - 99%)    Parameters below as of 16 Jul 2023 18:48  Patient On (Oxygen Delivery Method): room air        Gen: NAD  Abd: + BS, soft, nontender, nondistended, no rebound or guarding  Incision intact. Small amount of serosanguinous fluid leaked from incision overnight.   uterus firm at midline  No RUQ/epigastric tenderness  : lochia WNL  Extremities: no swelling or calf tenderness

## 2023-07-17 NOTE — CHART NOTE - NSCHARTNOTEFT_GEN_A_CORE
Called to bedside by RN for BP of 163/105. Upon interviewing patient, she was in no acute distress, denied headache, vision changes, shortness of breath, RUQ change. Patient took oral nifedipine 30mg and pressure was taken after 15 minutes. Repeat pressure was 155 systolic. Will continue to monitor pressures. Called to bedside by RN for BP of 163/105. Upon interviewing patient, she was in no acute distress, denied headache, vision changes, shortness of breath, RUQ change. Patient took oral nifedipine 30mg and pressure was taken after 15 minutes. Repeat pressure was 155/96. Will continue to monitor pressures.

## 2023-07-18 ENCOUNTER — APPOINTMENT (OUTPATIENT)
Dept: ANTEPARTUM | Facility: CLINIC | Age: 33
End: 2023-07-18

## 2023-07-18 ENCOUNTER — TRANSCRIPTION ENCOUNTER (OUTPATIENT)
Age: 33
End: 2023-07-18

## 2023-07-18 RX ORDER — LABETALOL HCL 100 MG
300 TABLET ORAL THREE TIMES A DAY
Refills: 0 | Status: DISCONTINUED | OUTPATIENT
Start: 2023-07-18 | End: 2023-07-19

## 2023-07-18 RX ORDER — ZINC OXIDE 200 MG/G
1 OINTMENT TOPICAL DAILY
Refills: 0 | Status: DISCONTINUED | OUTPATIENT
Start: 2023-07-18 | End: 2023-07-25

## 2023-07-18 RX ADMIN — Medication 600 MILLIGRAM(S): at 13:40

## 2023-07-18 RX ADMIN — Medication 975 MILLIGRAM(S): at 16:24

## 2023-07-18 RX ADMIN — ENOXAPARIN SODIUM 40 MILLIGRAM(S): 100 INJECTION SUBCUTANEOUS at 09:39

## 2023-07-18 RX ADMIN — Medication 975 MILLIGRAM(S): at 10:35

## 2023-07-18 RX ADMIN — Medication 5 MILLIGRAM(S): at 19:10

## 2023-07-18 RX ADMIN — Medication 600 MILLIGRAM(S): at 19:00

## 2023-07-18 RX ADMIN — Medication 975 MILLIGRAM(S): at 09:39

## 2023-07-18 RX ADMIN — Medication 300 MILLIGRAM(S): at 07:41

## 2023-07-18 RX ADMIN — Medication 1 APPLICATION(S): at 12:46

## 2023-07-18 RX ADMIN — Medication 600 MILLIGRAM(S): at 12:46

## 2023-07-18 RX ADMIN — Medication 975 MILLIGRAM(S): at 15:31

## 2023-07-18 RX ADMIN — Medication 300 MILLIGRAM(S): at 15:31

## 2023-07-18 RX ADMIN — ZINC OXIDE 1 APPLICATION(S): 200 OINTMENT TOPICAL at 12:46

## 2023-07-18 RX ADMIN — Medication 600 MILLIGRAM(S): at 18:03

## 2023-07-18 RX ADMIN — Medication 975 MILLIGRAM(S): at 22:00

## 2023-07-18 RX ADMIN — Medication 300 MILLIGRAM(S): at 22:25

## 2023-07-18 RX ADMIN — Medication 600 MILLIGRAM(S): at 05:56

## 2023-07-18 RX ADMIN — Medication 30 MILLIGRAM(S): at 05:55

## 2023-07-18 RX ADMIN — Medication 975 MILLIGRAM(S): at 20:42

## 2023-07-18 NOTE — DISCHARGE NOTE OB - PATIENT PORTAL LINK FT
You can access the FollowMyHealth Patient Portal offered by [facility] by registering at the following website: [email or web address]. By joining [facility]’s FollowMyHealth portal, you will also be able to view your health information using other applications (apps) compatible with our system.

## 2023-07-18 NOTE — DISCHARGE NOTE OB - HOSPITAL COURSE
Patient underwent an uncomplicated primary LTCS for failed IOL for cHTN. She was diagnosed w/ chorioamnionitis and received 24hr IV antibiotics. She was also diagnosed w/ PEC w/ SF due to BP. Cardiology was consulted and BP are currently well controlled on labetalol 500mg TID, enalapril 10mg QD, and nifedipine 30mg QD. Patient’s postoperative course was unremarkable and she remained hemodynamically stable and afebrile throughout. Upon discharge the patient is ambulating without assistance, voiding spontaneously, tolerating oral intake. Pain is well controlled with oral medication and vital signs are stable.

## 2023-07-18 NOTE — PROGRESS NOTE ADULT - SUBJECTIVE AND OBJECTIVE BOX
Patient evaluated at bedside.   She reports pain is well controlled with OPM.  She has been ambulating without assistance, voiding spontaneously, passing gas, tolerating regular diet.  She denies HA, dizziness, CP, palpitations, SOB, RUQ/epigastric pain, n/v, or heavy vaginal bleeding.    Physical Exam:  Vital Signs Last 24 Hrs  T(C): 36.8 (18 Jul 2023 05:58), Max: 36.8 (17 Jul 2023 14:11)  T(F): 98.2 (18 Jul 2023 05:58), Max: 98.3 (18 Jul 2023 02:05)  HR: 100 (18 Jul 2023 07:41) (96 - 135)  BP: 160/94 (18 Jul 2023 07:41) (136/91 - 160/104)  BP(mean): --  RR: 18 (18 Jul 2023 07:41) (16 - 18)  SpO2: 98% (18 Jul 2023 07:41) (97% - 99%)    Parameters below as of 18 Jul 2023 07:41  Patient On (Oxygen Delivery Method): room air        Gen: NAD  Abd: + BS, soft, nontender, nondistended, no rebound or guarding  Incision clean, dry and intact  uterus firm at midline  No RUQ/epigastric tenderness  : lochia WNL  Extremities: no swelling or calf tenderness

## 2023-07-18 NOTE — DISCHARGE NOTE OB - CARE PLAN
1 Principal Discharge DX:	 delivery delivered  Assessment and plan of treatment:	 delivery, meeting all postoperative milestones.  Please follow-up with your OB doctor in 1 week for blood pressure check.  You can resume a regular diet at home and may continue your prenatal vitamins as directed.  Please place nothing in the vagina for 6 weeks (no tampons, sex, douching, tub baths, swimming pools, etc).  If you have severe headaches and/or vision changes, heavy bleeding, or chest pain, please call your provider or go to the nearest Emergency Department.  Please call your OB with any signs of symptoms of infection including fever > 100.4 degrees, severe pain, malodorous vaginal discharge or heavy bleeding requiring more than 1-2 pads/hour.  You can take Motrin 600mg orally every 6 hours and Tylenol 1000mg orally every 6 hours for pain as needed.  Secondary Diagnosis:	Chronic hypertension  Assessment and plan of treatment:	Monitor blood pressure twice daily.  Document blood pressures to review with obstetrician at follow-up appointment.  Call doctor for persistent systolic blood pressure (top number) equal or greater to 150 AND/OR diastolic blood pressure (bottom number) equal or above 100.      Return to hospital for systolic blood pressure (top number) equal to or greater than 160 AND/OR diastolic blood pressure (bottom number) equal to or greater than 110 OR any of the following symptoms: changes in vision, headache not relieved with Tylenol, severe abdominal pain, vomiting, increased vaginal bleeding, chest pain, or shortness of breath.  Secondary Diagnosis:	Acute postoperative anemia due to expected blood loss  Secondary Diagnosis:	Chorioamnionitis   Principal Discharge DX:	 delivery delivered  Assessment and plan of treatment:	 delivery, meeting all postoperative milestones.  Please follow-up with your OB doctor in 1 week for blood pressure check.  You can resume a regular diet at home and may continue your prenatal vitamins as directed.  Please place nothing in the vagina for 6 weeks (no tampons, sex, douching, tub baths, swimming pools, etc).  If you have severe headaches and/or vision changes, heavy bleeding, or chest pain, please call your provider or go to the nearest Emergency Department.  Please call your OB with any signs of symptoms of infection including fever > 100.4 degrees, severe pain, malodorous vaginal discharge or heavy bleeding requiring more than 1-2 pads/hour.  You can take Motrin 600mg orally every 6 hours and Tylenol 1000mg orally every 6 hours for pain as needed.  Secondary Diagnosis:	Chronic hypertension  Assessment and plan of treatment:	Follow up with your OB in one week for a Bloodpressure check.  Purchase electronic blood pressure cuff at your local pharmacy. Check blood pressure 3x a day. If bp >160/110, you develop a headache not relieved by tylenol, visual disturbances, or right upper abdominal pain, call your doctor or the hospital, or go to your nearest emergency room. Please continue to take your anti-hypertensive medications. Please take Labetalol 500mg every 8 hours at 6:00am, 2:00pm, and 10:00pm. Please take nifedipine 30mg every day at 8:00am. Please take enalapril 10mg every day at 6:00pm.  Secondary Diagnosis:	Acute postoperative anemia due to expected blood loss  Secondary Diagnosis:	Chorioamnionitis  Assessment and plan of treatment:	S/p 24hr IV antibiotics   Principal Discharge DX:	 delivery delivered  Assessment and plan of treatment:	 delivery, meeting all postoperative milestones.  Please follow-up with your OB doctor in 1 week for blood pressure check.  You can resume a regular diet at home and may continue your prenatal vitamins as directed.  Please place nothing in the vagina for 6 weeks (no tampons, sex, douching, tub baths, swimming pools, etc).  If you have severe headaches and/or vision changes, heavy bleeding, or chest pain, please call your provider or go to the nearest Emergency Department.  Please call your OB with any signs of symptoms of infection including fever > 100.4 degrees, severe pain, malodorous vaginal discharge or heavy bleeding requiring more than 1-2 pads/hour.  You can take Motrin 600mg orally every 6 hours and Tylenol 1000mg orally every 6 hours for pain as needed.  Secondary Diagnosis:	Chronic hypertension  Assessment and plan of treatment:	Follow up with your OB in one week for a Bloodpressure check.  Purchase electronic blood pressure cuff at your local pharmacy. Check blood pressure 3x a day. If bp >160/110, you develop a headache not relieved by tylenol, visual disturbances, or right upper abdominal pain, call your doctor or the hospital, or go to your nearest emergency room. Please continue to take your anti-hypertensive medications. Please take Labetalol 500mg every 8 hours at 6:00am, 2:00pm, and 10:00pm. Please take nifedipine 30mg every day at 8:00am. Please take enalapril 10mg every day at 6:00pm. Please make a follow up appointment to see Dr. Rehman in one week.  Secondary Diagnosis:	Acute postoperative anemia due to expected blood loss  Secondary Diagnosis:	Chorioamnionitis  Assessment and plan of treatment:	S/p 24hr IV antibiotics

## 2023-07-18 NOTE — DISCHARGE NOTE OB - CARE PROVIDER_API CALL
[facility],   [address] [place] [address]  Phone: [phone]  Fax: (   )    -  Follow Up Time: 1 week   [facility],   [address] [address]  Phone: [phone]  Fax: (   )    -  Follow Up Time: 1 week    Maria Elena Rehman  Cardiovascular Disease  [address], [facility] [address]  [place] [address]  Phone: [phone]  Fax: [phone]  Follow Up Time: 1 week

## 2023-07-18 NOTE — DISCHARGE NOTE OB - NS MD DC FALL RISK RISK
For information on Fall & Injury Prevention, visit: [email or web address] OR  [email or web address] OR  https://www.cdc.gov/steadi/patient.html

## 2023-07-18 NOTE — DISCHARGE NOTE OB - MEDICATION SUMMARY - MEDICATIONS TO TAKE
I will START or STAY ON the medications listed below when I get home from the hospital:    ibuprofen 600 mg oral tablet  -- 1 tab(s) by mouth every 6 hours  -- Indication: For Pain    acetaminophen 325 mg oral tablet  -- 3 tab(s) by mouth every 6 hours  -- Indication: For Pain    enalapril 10 mg oral tablet  -- 1 tab(s) by mouth every 24 hours  -- Indication: For Hypertension    labetalol 100 mg oral tablet  -- 5 tab(s) by mouth 3 times a day  -- Indication: For Hypertension    NIFEdipine 30 mg oral tablet, extended release  -- 1 tab(s) by mouth every 24 hours  -- Indication: For Hypertension

## 2023-07-18 NOTE — DISCHARGE NOTE OB - PROVIDER TOKENS
FREE:[LAST:[[facility]],PHONE:[phone]],FAX:[(   )    -],ADDRESS:[[address] [place] [address]],FOLLOWUP:[1 week]] FREE:[LAST:[[facility]],PHONE:[phone]],FAX:[(   )    -],ADDRESS:[[address] [place] [address]],FOLLOWUP:[1 week]],PROVIDER:[TOKEN:[[id number]],FOLLOWUP:[1 week]]

## 2023-07-18 NOTE — DISCHARGE NOTE OB - CARE PROVIDERS DIRECT ADDRESSES
,DirectAddress_Unknown ,DirectAddress_Unknown,[email or web address]

## 2023-07-18 NOTE — DISCHARGE NOTE OB - PLAN OF CARE
Monitor blood pressure twice daily.  Document blood pressures to review with obstetrician at follow-up appointment.  Call doctor for persistent systolic blood pressure (top number) equal or greater to 150 AND/OR diastolic blood pressure (bottom number) equal or above 100.      Return to hospital for systolic blood pressure (top number) equal to or greater than 160 AND/OR diastolic blood pressure (bottom number) equal to or greater than 110 OR any of the following symptoms: changes in vision, headache not relieved with Tylenol, severe abdominal pain, vomiting, increased vaginal bleeding, chest pain, or shortness of breath.  delivery, meeting all postoperative milestones.  Please follow-up with your OB doctor in 1 week for blood pressure check.  You can resume a regular diet at home and may continue your prenatal vitamins as directed.  Please place nothing in the vagina for 6 weeks (no tampons, sex, douching, tub baths, swimming pools, etc).  If you have severe headaches and/or vision changes, heavy bleeding, or chest pain, please call your provider or go to the nearest Emergency Department.  Please call your OB with any signs of symptoms of infection including fever > 100.4 degrees, severe pain, malodorous vaginal discharge or heavy bleeding requiring more than 1-2 pads/hour.  You can take Motrin 600mg orally every 6 hours and Tylenol 1000mg orally every 6 hours for pain as needed. Follow up with your OB in one week for a Bloodpressure check.  Purchase electronic blood pressure cuff at your local pharmacy. Check blood pressure 3x a day. If bp >160/110, you develop a headache not relieved by tylenol, visual disturbances, or right upper abdominal pain, call your doctor or the hospital, or go to your nearest emergency room. Please continue to take your anti-hypertensive medications. Please take Labetalol 500mg every 8 hours at 6:00am, 2:00pm, and 10:00pm. Please take nifedipine 30mg every day at 8:00am. Please take enalapril 10mg every day at 6:00pm. S/p 24hr IV antibiotics Follow up with your OB in one week for a Bloodpressure check.  Purchase electronic blood pressure cuff at your local pharmacy. Check blood pressure 3x a day. If bp >160/110, you develop a headache not relieved by tylenol, visual disturbances, or right upper abdominal pain, call your doctor or the hospital, or go to your nearest emergency room. Please continue to take your anti-hypertensive medications. Please take Labetalol 500mg every 8 hours at 6:00am, 2:00pm, and 10:00pm. Please take nifedipine 30mg every day at 8:00am. Please take enalapril 10mg every day at 6:00pm. Please make a follow up appointment to see Dr. Rehman in one week.

## 2023-07-18 NOTE — PROGRESS NOTE ADULT - ASSESSMENT
32y Female POD#2 s/p C/S, c/b cHTN and chorioamnionitis   - cHTN: BP high mild range overnight. Changing BP medication today from Labetalol 200mg BID and Nifedipine 30mg BID to Labetalol 300 TID (and d/c Nifedipine). Patient is intermittently tachycardic overnight to a maximum of 128, most recently 100.   - Chorioamnionitis: afebrile, s/p IV A/G/C.                      - Neuro/Pain: motrin atc, tylenol atc, oxy prn  - CV:  VSq4h  - Pulm: Encourage ISS & Ambulation  - GI: Advance as tolerated  - : Voiding spontaneously  - DVT ppx: SCDs, Lovenox 40mg QD  - Dispo: when BP well controlled 32y Female POD#4 s/p C/S, c/b cHTN and chorioamnionitis   - cHTN: BP high mild range overnight. Changing BP medication today from Labetalol 200mg BID and Nifedipine 30mg BID to Labetalol 300 TID (and d/c Nifedipine). Patient is intermittently tachycardic overnight to a maximum of 128, most recently 100.   - Chorioamnionitis: afebrile, s/p IV A/G/C.                      - Neuro/Pain: motrin atc, tylenol atc, oxy prn  - CV:  VSq4h  - Pulm: Encourage ISS & Ambulation  - GI: Advance as tolerated  - : Voiding spontaneously  - DVT ppx: SCDs, Lovenox 40mg QD  - Dispo: when BP well controlled

## 2023-07-18 NOTE — DISCHARGE NOTE OB - NSCORESITESY/N_GEN_A_CORE_RD
Case Management Initial Discharge Plan  [place],             Met with:patient to discuss discharge plans. Information verified: address, contacts, phone number, , insurance Yes  PCP: Dorothy Smallwood MD  Date of last visit: 2020    Insurance Provider: [employer]    Discharge Planning    Living Arrangements:  Spouse/Significant Other   Support Systems:  Family Members    Home has 1 stories  2 stairs to climb to get into front door, 0stairs to climb to reach second floor  Location of bedroom/bathroom in home main    Patient able to perform ADL's:Independent    Current Services (outpatient & in home) none  DME equipment: none  DME provider: none      Potential Assistance Needed:  N/A    Patient agreeable to home care: No  [place] of choice provided:  n/a    Prior SNF/Rehab Placement and Facility: none  Agreeable to SNF/Rehab: No  [place] of choice provided: n/a   Evaluation: no    Expected Discharge date:     Patient expects to be discharged to:  home  Follow Up Appointment: Best Day/ Time:      Transportation provider: self  Transportation arrangements needed for discharge: No    Readmission Risk              Risk of Unplanned Readmission:        0             Does patient have a readmission risk score greater than 14?: not calculated  If yes, follow-up appointment must be made within 7 days of discharge.      Goals of Care: chest pain to go away      Discharge Plan: Home independently no skilled needs, has transportation          Electronically signed by Kp Valdes RN on 3/9/20 at 10:44 PM EDT No

## 2023-07-19 LAB
A1C WITH ESTIMATED AVERAGE GLUCOSE RESULT: 4.9 % — SIGNIFICANT CHANGE UP (ref 4–5.6)
BLD GP AB SCN SERPL QL: NEGATIVE — SIGNIFICANT CHANGE UP
CHOLEST SERPL-MCNC: 169 MG/DL — SIGNIFICANT CHANGE UP
ESTIMATED AVERAGE GLUCOSE: 94 MG/DL — SIGNIFICANT CHANGE UP (ref 68–114)
HDLC SERPL-MCNC: 42 MG/DL — LOW
LIPID PNL WITH DIRECT LDL SERPL: 81 MG/DL — SIGNIFICANT CHANGE UP
NON HDL CHOLESTEROL: 127 MG/DL — SIGNIFICANT CHANGE UP
RH IG SCN BLD-IMP: POSITIVE — SIGNIFICANT CHANGE UP
TRIGL SERPL-MCNC: 232 MG/DL — HIGH
TSH SERPL-MCNC: 2.49 UIU/ML — SIGNIFICANT CHANGE UP (ref 0.27–4.2)

## 2023-07-19 PROCEDURE — 99221 1ST HOSP IP/OBS SF/LOW 40: CPT

## 2023-07-19 PROCEDURE — 93306 TTE W/DOPPLER COMPLETE: CPT | Mod: 26

## 2023-07-19 PROCEDURE — 93010 ELECTROCARDIOGRAM REPORT: CPT

## 2023-07-19 RX ORDER — LABETALOL HCL 100 MG
400 TABLET ORAL THREE TIMES A DAY
Refills: 0 | Status: DISCONTINUED | OUTPATIENT
Start: 2023-07-19 | End: 2023-07-24

## 2023-07-19 RX ORDER — LABETALOL HCL 100 MG
100 TABLET ORAL ONCE
Refills: 0 | Status: COMPLETED | OUTPATIENT
Start: 2023-07-19 | End: 2023-07-19

## 2023-07-19 RX ORDER — TETANUS TOXOID, REDUCED DIPHTHERIA TOXOID AND ACELLULAR PERTUSSIS VACCINE, ADSORBED 5; 2.5; 8; 8; 2.5 [IU]/.5ML; [IU]/.5ML; UG/.5ML; UG/.5ML; UG/.5ML
0.5 SUSPENSION INTRAMUSCULAR ONCE
Refills: 0 | Status: COMPLETED | OUTPATIENT
Start: 2023-07-19 | End: 2023-07-20

## 2023-07-19 RX ADMIN — Medication 975 MILLIGRAM(S): at 03:00

## 2023-07-19 RX ADMIN — Medication 975 MILLIGRAM(S): at 10:19

## 2023-07-19 RX ADMIN — Medication 600 MILLIGRAM(S): at 06:33

## 2023-07-19 RX ADMIN — Medication 100 MILLIGRAM(S): at 10:50

## 2023-07-19 RX ADMIN — Medication 600 MILLIGRAM(S): at 12:23

## 2023-07-19 RX ADMIN — Medication 300 MILLIGRAM(S): at 06:10

## 2023-07-19 RX ADMIN — Medication 5 MILLIGRAM(S): at 19:20

## 2023-07-19 RX ADMIN — Medication 600 MILLIGRAM(S): at 00:00

## 2023-07-19 RX ADMIN — Medication 975 MILLIGRAM(S): at 11:15

## 2023-07-19 RX ADMIN — Medication 400 MILLIGRAM(S): at 14:11

## 2023-07-19 RX ADMIN — Medication 975 MILLIGRAM(S): at 20:37

## 2023-07-19 RX ADMIN — Medication 600 MILLIGRAM(S): at 13:10

## 2023-07-19 RX ADMIN — Medication 400 MILLIGRAM(S): at 21:58

## 2023-07-19 RX ADMIN — ENOXAPARIN SODIUM 40 MILLIGRAM(S): 100 INJECTION SUBCUTANEOUS at 07:00

## 2023-07-19 RX ADMIN — Medication 975 MILLIGRAM(S): at 16:25

## 2023-07-19 RX ADMIN — Medication 600 MILLIGRAM(S): at 01:00

## 2023-07-19 RX ADMIN — Medication 975 MILLIGRAM(S): at 02:14

## 2023-07-19 RX ADMIN — Medication 975 MILLIGRAM(S): at 17:20

## 2023-07-19 NOTE — CONSULT NOTE ADULT - SUBJECTIVE AND OBJECTIVE BOX
**Incomplete Note**    Cardiology Consult      HPI:        Pt seen and examined at bedside, NAD, denies chest pain/pressure/discomfort. ROS s/f ?,      Review of Systems:  CONSTITUTIONAL:  No weight loss, fever, chills, weakness or fatigue.  HEENT:  Eyes:  No visual loss, blurred vision, double vision or yellow sclerae. Ears, Nose, Throat:  No hearing loss, sneezing, congestion, runny nose or sore throat.  SKIN:  No rash or itching.  CARDIOVASCULAR:  No chest pain, chest pressure or chest discomfort. No palpitations or edema.  RESPIRATORY:  No shortness of breath, cough or sputum.  GASTROINTESTINAL:  No anorexia, nausea, vomiting or diarrhea. No abdominal pain or blood.  GENITOURINARY: No Burning on urination.   NEUROLOGICAL:  see HPI  MUSCULOSKELETAL:  No muscle, back pain, joint pain or stiffness.  HEMATOLOGIC:  No anemia, bleeding or bruising.  LYMPHATICS:  No enlarged nodes. No history of splenectomy.  PSYCHIATRIC:  No history of depression or anxiety.  ENDOCRINOLOGIC:  No reports of sweating, cold or heat intolerance. No polyuria or polydipsia.  ALLERGIES:  No history of asthma, hives, eczema or rhinitis.    PAST MEDICAL & SURGICAL HISTORY:    SOCIAL HISTORY:  FAMILY HISTORY:    ALLERGIES: 	  No Known Drug Allergies  adhesives (Rash)          MEDICATIONS:  acetaminophen     Tablet .. 975 milliGRAM(s) Oral <User Schedule>  diphenhydrAMINE 25 milliGRAM(s) Oral every 6 hours PRN  diphtheria/tetanus/pertussis (acellular) Vaccine (Adacel) 0.5 milliLiter(s) IntraMuscular once  enalapril 5 milliGRAM(s) Oral every 24 hours  enoxaparin Injectable 40 milliGRAM(s) SubCutaneous every 24 hours  ibuprofen  Tablet. 600 milliGRAM(s) Oral every 6 hours  labetalol 400 milliGRAM(s) Oral three times a day  lactated ringers. 1000 milliLiter(s) IV Continuous <Continuous>  lanolin Ointment 1 Application(s) Topical every 6 hours PRN  magnesium hydroxide Suspension 30 milliLiter(s) Oral two times a day PRN  oxyCODONE    IR 5 milliGRAM(s) Oral once PRN  oxyCODONE    IR 5 milliGRAM(s) Oral every 3 hours PRN  oxytocin Infusion 333.333 milliUNIT(s)/Min IV Continuous <Continuous>  simethicone 80 milliGRAM(s) Chew every 4 hours PRN  zinc oxide 20% Ointment 1 Application(s) Topical daily      T(C): 36.7 (07-19-23 @ 10:24), Max: 36.9 (07-18-23 @ 14:24)  HR: 86 (07-19-23 @ 10:24) (77 - 98)  BP: 133/89 (07-19-23 @ 10:24) (130/84 - 161/99)  RR: 18 (07-19-23 @ 10:24) (17 - 18)  SpO2: 98% (07-19-23 @ 10:24) (98% - 100%)      PHYSICAL EXAM:    Constitutional: resting comfortably in bed; NAD  HEENT: NC/AT, PERRL, EOMI, anicteric sclera, no nasal discharge; uvula midline, no oropharyngeal erythema or exudates; MMM  Neck: supple; no thyromegaly, JVP ? cm H20, JVD +/-  Respiratory: CTA B/L; no W/R/R, no retractions  Cardiac: +S1/S2; RRR; no M/R/G; PMI non-displaced  Gastrointestinal: soft, NT/ND; no rebound or guarding; +BSx4  Extremities: WWP, no clubbing or cyanosis; no peripheral edema  Musculoskeletal: NROM x4; no joint swelling, tenderness or erythema  Vascular: 2+ radial, DP/PT pulses B/L  Dermatologic: skin warm, dry and intact; no rashes, wounds, or scars  Lymphatic: no submandibular or cervical LAD  Neurologic: AAOx3; CNII-XII grossly intact; no focal deficits        I&O's Summary      LABS:	 	              proBNP:   Lipid Profile:   HgA1c:   TSH:     TELEMETRY: 	    ECG:  	  RADIOLOGY:   ECHO:  STRESS:  CATH:   Cardiology Consult      HPI: 33 y/o F w/ PMH PCOS admitted for pregnancy delivery, pregnancy complicated by chorioamnionitis treated w/ ampicillin, clindamycin, and gentamicin, s/p . Cardiology consulted for treatment of hypertension, unclear if chronic vs. gestational. Cardiology consulted for hypertension management.        Pt seen and examined at bedside, NAD. Denies chest pain/pressure/discomfort, headache, dizziness, blurry vision, nausea, or vomiting. ROS negative.       PAST MEDICAL & SURGICAL HISTORY:  - PCOS    SOCIAL HISTORY:  FAMILY HISTORY:    ALLERGIES: 	  No Known Drug Allergies  adhesives (Rash)        MEDICATIONS:  acetaminophen     Tablet .. 975 milliGRAM(s) Oral <User Schedule>  diphenhydrAMINE 25 milliGRAM(s) Oral every 6 hours PRN  diphtheria/tetanus/pertussis (acellular) Vaccine (Adacel) 0.5 milliLiter(s) IntraMuscular once  enalapril 5 milliGRAM(s) Oral every 24 hours  enoxaparin Injectable 40 milliGRAM(s) SubCutaneous every 24 hours  ibuprofen  Tablet. 600 milliGRAM(s) Oral every 6 hours  labetalol 400 milliGRAM(s) Oral three times a day  lactated ringers. 1000 milliLiter(s) IV Continuous <Continuous>  lanolin Ointment 1 Application(s) Topical every 6 hours PRN  magnesium hydroxide Suspension 30 milliLiter(s) Oral two times a day PRN  oxyCODONE    IR 5 milliGRAM(s) Oral once PRN  oxyCODONE    IR 5 milliGRAM(s) Oral every 3 hours PRN  oxytocin Infusion 333.333 milliUNIT(s)/Min IV Continuous <Continuous>  simethicone 80 milliGRAM(s) Chew every 4 hours PRN  zinc oxide 20% Ointment 1 Application(s) Topical daily      T(C): 36.7 (23 @ 10:24), Max: 36.9 (23 @ 14:24)  HR: 86 (23 @ 10:24) (77 - 98)  BP: 133/89 (23 @ 10:24) (130/84 - 161/99)  RR: 18 (23 @ 10:24) (17 - 18)  SpO2: 98% (23 @ 10:24) (98% - 100%)      PHYSICAL EXAM:    Constitutional: resting comfortably in bed; NAD  HEENT: NC/AT, EOMI, anicteric sclera, MMM  Neck: supple  Respiratory: CTA B/L; no W/R/R, no retractions  Cardiac: +S1/S2; RRR; no M/R/G  Gastrointestinal: soft, NT/ND; no rebound or guarding; +BSx4  Extremities: WWP, no clubbing or cyanosis; no peripheral edema  Musculoskeletal: NROM x4 grossly; no joint swelling, tenderness or erythema  Vascular: 2+ radial, DP/PT pulses B/L  Dermatologic: skin warm, dry and intact; rash on lower abdomen,  wound scar  Neurologic: AAOx3; CNII-XII grossly intact; no focal deficits        Lipid Profile:      -Cholesterol: 169     -Triglycerides: 232     -HDL cholesterol: 42     -Non HDL cholesterol: 127     -LDL cholesterol: 81  HgA1c: 4.9     Cardiology Consult      HPI: 31 y/o F w/ PMH PCOS admitted for pregnancy delivery, pregnancy complicated by chorioamnionitis treated w/ ampicillin, clindamycin, and gentamicin, s/p . Cardiology consulted for treatment of hypertension, unclear if chronic vs. gestational. Cardiology consulted for hypertension management.        Pt seen and examined at bedside, NAD. Denies chest pain/pressure/discomfort, headache, dizziness, blurry vision, nausea, or vomiting. ROS negative. Patient expresses desire to establish care with new PCP associated with [facility] Le[facility] for f/u of new HTN diagnosis.       PAST MEDICAL & SURGICAL HISTORY:  - PCOS    SOCIAL HISTORY: social alcohol use, denies tobacco use, denies drug use  FAMILY HISTORY: HTN in father; DM in mother and father; breast cancer in maternal grandmother    ALLERGIES: 	  No Known Drug Allergies  adhesives (Rash)        MEDICATIONS:  acetaminophen     Tablet .. 975 milliGRAM(s) Oral <User Schedule>  diphenhydrAMINE 25 milliGRAM(s) Oral every 6 hours PRN  diphtheria/tetanus/pertussis (acellular) Vaccine (Adacel) 0.5 milliLiter(s) IntraMuscular once  enalapril 5 milliGRAM(s) Oral every 24 hours  enoxaparin Injectable 40 milliGRAM(s) SubCutaneous every 24 hours  ibuprofen  Tablet. 600 milliGRAM(s) Oral every 6 hours  labetalol 400 milliGRAM(s) Oral three times a day  lactated ringers. 1000 milliLiter(s) IV Continuous <Continuous>  lanolin Ointment 1 Application(s) Topical every 6 hours PRN  magnesium hydroxide Suspension 30 milliLiter(s) Oral two times a day PRN  oxyCODONE    IR 5 milliGRAM(s) Oral once PRN  oxyCODONE    IR 5 milliGRAM(s) Oral every 3 hours PRN  oxytocin Infusion 333.333 milliUNIT(s)/Min IV Continuous <Continuous>  simethicone 80 milliGRAM(s) Chew every 4 hours PRN  zinc oxide 20% Ointment 1 Application(s) Topical daily      T(C): 36.7 (23 @ 10:24), Max: 36.9 (23 @ 14:24)  HR: 86 (23 @ 10:24) (77 - 98)  BP: 133/89 (23 @ 10:24) (130/84 - 161/99)  RR: 18 (23 @ 10:24) (17 - 18)  SpO2: 98% (23 @ 10:24) (98% - 100%)      PHYSICAL EXAM:    Constitutional: resting comfortably in bed; NAD  HEENT: NC/AT, EOMI, anicteric sclera, MMM  Neck: supple  Respiratory: CTA B/L; no W/R/R, no retractions  Cardiac: +S1/S2; RRR; no M/R/G  Gastrointestinal: soft, NT/ND; no rebound or guarding; +BSx4  Extremities: WWP, no clubbing or cyanosis; no peripheral edema  Musculoskeletal: NROM x4 grossly; no joint swelling, tenderness or erythema  Vascular: 2+ radial, DP/PT pulses B/L  Dermatologic: skin warm, dry and intact; rash on lower abdomen,  wound scar  Neurologic: AAOx3; CNII-XII grossly intact; no focal deficits        Lipid Profile:      -Cholesterol: 169     -Triglycerides: 232     -HDL cholesterol: 42     -Non HDL cholesterol: 127     -LDL cholesterol: 81  HgA1c: 4.9

## 2023-07-19 NOTE — PROGRESS NOTE ADULT - SUBJECTIVE AND OBJECTIVE BOX
Patient evaluated at bedside.   She reports pain is well controlled with OPM.  She has been ambulating without assistance, voiding spontaneously, passing gas, tolerating regular diet.  She denies HA, dizziness, CP, palpitations, SOB, RUQ/epigastric pain, n/v, or heavy vaginal bleeding.    Physical Exam:  Vital Signs Last 24 Hrs  T(C): 36.7 (19 Jul 2023 06:09), Max: 36.9 (18 Jul 2023 10:30)  T(F): 98.1 (19 Jul 2023 06:09), Max: 98.4 (18 Jul 2023 10:30)  HR: 82 (19 Jul 2023 06:09) (77 - 100)  BP: 159/102 (19 Jul 2023 06:09) (131/88 - 161/99)  BP(mean): --  RR: 18 (19 Jul 2023 06:09) (17 - 18)  SpO2: 98% (19 Jul 2023 06:09) (98% - 100%)    Parameters below as of 19 Jul 2023 06:09  Patient On (Oxygen Delivery Method): room air        Gen: NAD  Abd: + BS, soft, nontender, nondistended, no rebound or guarding  Incision clean, dry and intact  uterus firm at midline  No RUQ/epigastric tenderness  : lochia WNL  Extremities: no swelling or calf tenderness

## 2023-07-19 NOTE — PROGRESS NOTE ADULT - ASSESSMENT
32y Female POD#5 s/p C/S, c/b cHTN and chorioamnionitis   - cHTN: BP high mild range overnight. Changing BP medication today from Labetalol 300mg TID and Enalapril 5mg qd.    - Chorioamnionitis: afebrile, s/p IV A/G/C.                      - Neuro/Pain: motrin atc, tylenol atc, oxy prn  - CV:  VSq4h  - Pulm: Encourage ISS & Ambulation  - GI: Advance as tolerated  - : Voiding spontaneously  - DVT ppx: SCDs, Lovenox 40mg QD  - Dispo: when BP well controlled

## 2023-07-19 NOTE — CONSULT NOTE ADULT - ASSESSMENT
Recommendations:    #XX  -  -  -      #XX  -  -  -      Recommendations above are preliminary pending discussion with an attending caridologist  Plan was discussed with primary team  We'll continue to follow, thank you for the consultation      John Hinojosa ([id number])  Cardiovascular Disease Fellow  Consult Pager: [phone]         31 y/o F w PMH PCOS admitted for pregnancy delivery, pregnancy complicated by chorioamnionitis treated w/ ampicillin, clindamycin, and gentamicin, now s/p . Cardiology consulted for treatment of hypertension, unclear if chronic vs. gestational. Per patient's hx of self-reported "white coat syndrome" and consistently high home blood pressure readings after 20 weeks, likely to be gestational.      Review of studies:  - EKG : normal sinus rhythm, no evidence of ischemia    Recommendations:    #HTN - chronic vs. gestational; per patient's hx of self-reported "white coat syndrome" and consistently high home blood pressure readings after 20 weeks, likely to be gestational.  - Obtain baseline EKG  - TTE for more information regarding possible chronicity of HTN  - Increase labetalol to 400mg TID with hold parameters of SBP < 120 and HR <60  - c/w enalapril qd  - continue to monitor BPs            Recommendations above are preliminary pending discussion with an attending caridologist  Plan was discussed with primary team  We'll continue to follow, thank you for the consultation      John Hinojosa (PGY5)  Cardiovascular Disease Fellow  Consult Pager: [phone]         33 y/o F w PMH PCOS admitted for pregnancy delivery, pregnancy complicated by chorioamnionitis treated w/ ampicillin, clindamycin, and gentamicin, now s/p . Cardiology consulted for treatment of hypertension, unclear if chronic vs. gestational. Per patient's hx of self-reported "white coat syndrome" and consistently high home blood pressure readings after 20 weeks, likely to be gestational.      Review of studies:  - EKG : normal sinus rhythm, no evidence of ischemia  - TTE : normal LV, mildly dilated LA, mod TR, PASP 32mmhg     Recommendations:    #HTN: unclear whether chronic vs. gestational HTN, per patient's hx of self-reported "white coat syndrome" and consistently high home blood pressure readings after 20 weeks, likely to be gestational.  - Increase labetalol to 400mg TID with hold parameters of SBP < 120 and HR <60  - c/w enalapril 5mg qd  - continue to monitor BPs  - Will need f/u within 2 weeks of dc w/Dr. Berna Varela      #HLD  -Hypertriglyceridemia on lipid panel  -Discussed lifestyle modifications including daily 30 min moderate exercise and healthy food habits      Case reviewed w/attending cardiologist  Plan discussed w/primary team  We will continue to follow, thank you for the consultation      John Hinojosa (PGY5)  Cardiovascular Disease Fellow  Consult Pager: [phone]         31 y/o F w PMH PCOS admitted for pregnancy delivery, pregnancy complicated by chorioamnionitis treated w/ ampicillin, clindamycin, and gentamicin, now s/p . Cardiology consulted for treatment of hypertension, unclear if chronic vs. gestational. Per patient's hx of self-reported "white coat syndrome" and consistently high home blood pressure readings after 20 weeks, likely to be gestational.      Review of studies:  - EKG : normal sinus rhythm, no evidence of ischemia  - TTE : normal LV, mildly dilated RA, mod TR, PASP 32mmhg     Recommendations:    #HTN: unclear whether chronic vs. gestational HTN, per patient's hx of self-reported "white coat syndrome" and consistently high home blood pressure readings after 20 weeks, likely to be gestational.  - Increase labetalol to 400mg TID with hold parameters of SBP < 120 and HR <60  - c/w enalapril 5mg qd  - continue to monitor BPs  - Will need f/u within 2 weeks of dc w/Dr. Berna Varela      #HLD  -Hypertriglyceridemia on lipid panel  -Discussed lifestyle modifications including daily 30 min moderate exercise and healthy food habits      Case reviewed w/attending cardiologist  Plan discussed w/primary team  We will continue to follow, thank you for the consultation      John Hinojosa (PGY5)  Cardiovascular Disease Fellow  Consult Pager: [phone]

## 2023-07-19 NOTE — CONSULT NOTE ADULT - ATTENDING COMMENTS
Patient is a 33 y/o Female w PMH PCOS, with intermittent HTN episodes noted around 16 weeks of pregnancy at OB's office, with subsequent Home BP monitoring within normal range until the third trimester ( 130/80'S),  admitted for pregnancy delivery via C section with course complicated by chorioamnionitis and HTN for which Cardiology is consulted     Review of studies:  - EKG 07/19: normal sinus rhythm, no evidence of ischemia    # HTN  # PCOS    1) HTN  - Unclear if chronic HTN. Patient noted intermittent HTN episodes noted around 16 weeks of pregnancy at OB's office, that she attributed to white coat hypertension, with subsequent Home BP monitoring within normal range until the third trimester, where he BP have ranged 130/80'S.  -     chronic vs. gestational; per patient's hx of self-reported "white coat syndrome" and consistently high home blood pressure readings after 20 weeks, likely to be gestational.  - Obtain baseline EKG  - TTE for more information regarding possible chronicity of HTN  - Increase labetalol to 400mg TID with hold parameters of SBP < 120 and HR <60  - c/w enalapril qd  - continue to monitor BPs Patient is a 33 y/o Female w PMH PCOS, with intermittent HTN episodes noted around 16 weeks of pregnancy at OB's office, with subsequent Home BP monitoring within normal range until the third trimester ( 130/80'S),  admitted for pregnancy delivery via C section with course complicated by chorioamnionitis and HTN for which Cardiology is consulted     Review of studies:  - EKG 07/19: normal sinus rhythm, no evidence of ischemia    # HTN  # PCOS  # Chorioamnionitis    1) HTN  - Unclear if chronic HTN. Patient noted intermittent HTN episodes noted around 16 weeks of pregnancy at OB's office, that she attributed to white coat hypertension, with subsequent Home BP monitoring within normal range until the third trimester, where he BP have ranged 130/80'S.  - Since delivery BP's have ranged from 130's-160's/90's-100's. Patient has remained asymptomatic without chest pain, Dyspnea, pulmonary edema  - Labs unremarkable: No proteinuria, thrombocytopenia or transaminitis  - Previously started on Nifedipine 30 mg Po BID and Labetalol 200 mg BID; Currently on labetalol 300 TID (7/18) and enalapril 5 mg daily. Nifedipine has been discontinued  - BP's remained elevated with a range of 130/84 and 159/102 in the last 24 hours  - Would increase her labetalol to 400 mg TID with strict holding parameters  and continue Enalapril 5 mg po qd  - Goal to keep BP <140/90. ideally <130  - Lipid profile with Hypertriglyceridemia. Will recommend lifestyle modifications and close outpatient monitoring  - PCOS places women at higher overall cardiovascular risk. Will need outpatient follow up with Dr Berna Varela within 2 weeks of DC    # Chorioamnionitis   - Noted worsening leukocytosis on labs today. pt has remained afebrile, however she has been on standing Motrin therapy which could mask potential fever. No tachycardia as well as she is on standing beta blockers  - Would monitor carefully for signs of infections    Cardiology will continue to follow, please call with any questions

## 2023-07-20 PROBLEM — Z00.00 ENCOUNTER FOR PREVENTIVE HEALTH EXAMINATION: Status: ACTIVE | Noted: 2023-07-20

## 2023-07-20 PROCEDURE — 99232 SBSQ HOSP IP/OBS MODERATE 35: CPT

## 2023-07-20 RX ORDER — MAGNESIUM SULFATE 500 MG/ML
4 VIAL (ML) INJECTION ONCE
Refills: 0 | Status: COMPLETED | OUTPATIENT
Start: 2023-07-20 | End: 2023-07-20

## 2023-07-20 RX ORDER — MAGNESIUM SULFATE 500 MG/ML
2 VIAL (ML) INJECTION
Qty: 40 | Refills: 0 | Status: DISCONTINUED | OUTPATIENT
Start: 2023-07-20 | End: 2023-07-24

## 2023-07-20 RX ORDER — HYDRALAZINE HCL 50 MG
10 TABLET ORAL ONCE
Refills: 0 | Status: COMPLETED | OUTPATIENT
Start: 2023-07-20 | End: 2023-07-20

## 2023-07-20 RX ORDER — FUROSEMIDE 40 MG
20 TABLET ORAL DAILY
Refills: 0 | Status: DISCONTINUED | OUTPATIENT
Start: 2023-07-20 | End: 2023-07-22

## 2023-07-20 RX ADMIN — TETANUS TOXOID, REDUCED DIPHTHERIA TOXOID AND ACELLULAR PERTUSSIS VACCINE, ADSORBED 0.5 MILLILITER(S): 5; 2.5; 8; 8; 2.5 SUSPENSION INTRAMUSCULAR at 06:40

## 2023-07-20 RX ADMIN — SODIUM CHLORIDE 125 MILLILITER(S): 9 INJECTION, SOLUTION INTRAVENOUS at 20:18

## 2023-07-20 RX ADMIN — Medication 975 MILLIGRAM(S): at 21:00

## 2023-07-20 RX ADMIN — Medication 600 MILLIGRAM(S): at 06:40

## 2023-07-20 RX ADMIN — Medication 400 MILLIGRAM(S): at 22:23

## 2023-07-20 RX ADMIN — SIMETHICONE 80 MILLIGRAM(S): 80 TABLET, CHEWABLE ORAL at 09:29

## 2023-07-20 RX ADMIN — Medication 600 MILLIGRAM(S): at 14:00

## 2023-07-20 RX ADMIN — Medication 300 GRAM(S): at 19:30

## 2023-07-20 RX ADMIN — Medication 600 MILLIGRAM(S): at 12:53

## 2023-07-20 RX ADMIN — Medication 600 MILLIGRAM(S): at 18:49

## 2023-07-20 RX ADMIN — Medication 20 MILLIGRAM(S): at 12:54

## 2023-07-20 RX ADMIN — Medication 600 MILLIGRAM(S): at 19:33

## 2023-07-20 RX ADMIN — Medication 400 MILLIGRAM(S): at 13:57

## 2023-07-20 RX ADMIN — Medication 400 MILLIGRAM(S): at 06:40

## 2023-07-20 RX ADMIN — Medication 600 MILLIGRAM(S): at 00:26

## 2023-07-20 RX ADMIN — Medication 10 MILLIGRAM(S): at 18:45

## 2023-07-20 RX ADMIN — Medication 50 GM/HR: at 20:15

## 2023-07-20 RX ADMIN — ENOXAPARIN SODIUM 40 MILLIGRAM(S): 100 INJECTION SUBCUTANEOUS at 07:33

## 2023-07-20 RX ADMIN — Medication 10 MILLIGRAM(S): at 18:49

## 2023-07-20 RX ADMIN — Medication 975 MILLIGRAM(S): at 22:00

## 2023-07-20 RX ADMIN — Medication 5 MILLIGRAM(S): at 12:53

## 2023-07-20 RX ADMIN — Medication 975 MILLIGRAM(S): at 02:04

## 2023-07-20 NOTE — PROGRESS NOTE ADULT - SUBJECTIVE AND OBJECTIVE BOX
Patient evaluated at bedside.   She reports pain is well controlled with OPM.  She has been ambulating without assistance, voiding spontaneously, passing gas, tolerating regular diet.  She denies HA, dizziness, CP, palpitations, SOB, RUQ/epigastric pain, n/v, or heavy vaginal bleeding.    Physical Exam:  Vital Signs Last 24 Hrs  T(C): 36.8 (20 Jul 2023 06:39), Max: 36.8 (19 Jul 2023 14:10)  T(F): 98.2 (20 Jul 2023 06:39), Max: 98.2 (19 Jul 2023 14:10)  HR: 80 (20 Jul 2023 06:39) (80 - 94)  BP: 145/92 (20 Jul 2023 06:39) (113/76 - 154/92)  BP(mean): --  RR: 18 (20 Jul 2023 06:39) (18 - 18)  SpO2: 100% (20 Jul 2023 06:39) (98% - 100%)    Parameters below as of 20 Jul 2023 06:39  Patient On (Oxygen Delivery Method): room air        Gen: NAD  Abd: + BS, soft, nontender, nondistended, no rebound or guarding  Incision clean, dry and intact  uterus firm at midline  No RUQ/epigastric tenderness  : lochia WNL  Extremities: no swelling or calf tenderness

## 2023-07-20 NOTE — PROGRESS NOTE ADULT - ASSESSMENT
32y Female POD#6 s/p C/S, c/b cHTN and chorioamnionitis   - cHTN: BP normal to mild range overnight. Continue BP medication today from Labetalol 300mg TID and Enalapril 5mg qd.    - Chorioamnionitis: afebrile, s/p IV A/G/C.                      - Neuro/Pain: motrin atc, tylenol atc, oxy prn  - CV:  VSq4h  - Pulm: Encourage ISS & Ambulation  - GI: Advance as tolerated  - : Voiding spontaneously  - DVT ppx: SCDs, Lovenox 40mg QD  - Dispo: when BP well controlled

## 2023-07-20 NOTE — CHART NOTE - NSCHARTNOTEFT_GEN_A_CORE
Patient is POD6 from primary c/s after failed IOL for chronic hypertension. She was evaluated at bedside for two sustained severe range blood pressures (169/103 then repeat 168/105). Hydralazine 10 mg IVP given at 18:00.   - Patient reports that a dull pain 4/10 headache started about 20 minutes prior to her elevated blood pressures. She denies blurry vision, chest pain, shortness of breath, RUQ pain, epigastric pain.     Exam   /105. HR 91   Pulm: no increased WOB, CTAB   CV: regular rate and rhythm   Abd: nontender, uterine fundus firm   Ext: +1 pitting edema to knees, no calf tenderness     A/P:   - Hydralazine 10 mg IVP given at 1800. Will repeat blood pressure in 20 minutes.   - Patient now qualifies for superimposed PEC with SF (BPs)   - Start magnesium x 24 hours. Will perform mag checks q 6 hours. Continue vitals q4.

## 2023-07-20 NOTE — PROGRESS NOTE ADULT - ASSESSMENT
33 y/o F w PMH PCOS admitted for pregnancy delivery, pregnancy complicated by chorioamnionitis treated w/ ampicillin, clindamycin, and gentamicin, now s/p . Cardiology consulted for treatment of hypertension, unclear if chronic vs. gestational. Per patient's hx of self-reported "white coat syndrome" and consistently high home blood pressure readings after 20 weeks, likely to be gestational. Since increase in labetalol dose to 400 TID, patient's BP has ranged from 113/76 to 145/92.     Recommendations:    #HTN: unclear whether chronic vs. gestational HTN, per patient's hx of self-reported "white coat syndrome" and consistently high home blood pressure readings after 20 weeks, likely to be gestational.  - Increase labetalol to 400mg TID with hold parameters of SBP < 120 and HR <60  - c/w enalapril 5mg qd  - continue to monitor BPs  - Will need f/u within 2 weeks of dc w/ Dr. Berna Varela      #HLD  -Hypertriglyceridemia on lipid panel  -Discussed lifestyle modifications including daily 30 min moderate exercise and healthy food habits      Case pending review w/attending cardiologist  Plan will be discussed w/primary team  We will continue to follow, thank you for the consultation      Tayler Paulino, PGY-1  Internal Medicine Resident  Consult Pager: [phone]   33 y/o F w PMH PCOS admitted for pregnancy delivery, pregnancy complicated by chorioamnionitis treated w/ ampicillin, clindamycin, and gentamicin, now s/p . Cardiology consulted for treatment of hypertension, unclear if chronic vs. gestational. Per patient's hx of self-reported "white coat syndrome" and consistently high home blood pressure readings after 20 weeks, likely to be gestational. On current/revised HTN medication regimen, patient is optimized for discharge from a cardiac standpoint.      Recommendations:    #HTN: unclear whether chronic vs. gestational HTN, per patient's hx of self-reported "white coat syndrome" and consistently high home blood pressure readings after 20 weeks, likely to be gestational.  - Since increase in labetalol dose to 400 TID on , patient's BP has ranged from 113/76 to 150/88, with an average SBP of high 140s     - c/w labetalol 400mg TID     - Increase enalapril to 10mg qd  - Measure and monitor BP at home on this regimen      - if symptoms of hypotension occur (dizziness, lightheadedness, weakness), or if SBP < 100, cut in half and only take 5mg qd      - if home SBPs consistently at or > 160s, contact cardiology team  - Will need f/u within 2 weeks of dc w/ Dr. Maria Elena Rehman for BP monitoring and f/u echocardiogram      #Elevated pulmonary artery pressure -  echo demonstrated PA pressure of 32 mmHg; this slight elevation in PA pressure may be a reflection of pregnancy or post-partum cardiac changes as a result of increased fluid volume  - Lasix 20mg PO for 3 days; first dose to be taken at home      #HLD  -Hypertriglyceridemia on lipid panel  -Discussed lifestyle modifications including daily 30 min moderate exercise and healthy food habits      Case reviewed w/attending cardiologist  Plan discussed w/primary team  Patient is optimized for discharge from a cardiac standpoint  Thank you for the consultation      Tayler Paulino, PGY-1  Internal Medicine Resident  Consult Pager: [phone]

## 2023-07-20 NOTE — PROGRESS NOTE ADULT - SUBJECTIVE AND OBJECTIVE BOX
Cardiology Consult    O/N: RON    Interval History/HPI: Pt seen and examined at bedside, NAD, denies chest pain/discomfort/pressure, denies headache, dizziness, blurry vision. ROS unremarkable           OBJECTIVE  T(C): 36.8 (07-20-23 @ 06:39), Max: 36.8 (07-19-23 @ 14:10)  HR: 80 (07-20-23 @ 06:39) (80 - 94)  BP: 145/92 (07-20-23 @ 06:39) (113/76 - 154/92)  RR: 18 (07-20-23 @ 06:39) (18 - 18)  SpO2: 100% (07-20-23 @ 06:39) (98% - 100%)      PHYSICAL EXAM:    Constitutional: resting comfortably in bed; NAD  HEENT: NC/AT, EOMI, anicteric sclera, MMM  Neck: supple  Respiratory: CTA B/L; no W/R/R, no retractions  Cardiac: +S1/S2; RRR; no M/R/G  Gastrointestinal: soft, NT/ND; no rebound or guarding; +BSx4  Extremities: WWP, no clubbing or cyanosis; no peripheral edema  Musculoskeletal: NROM x4 grossly, no joint swelling, tenderness or erythema  Vascular: 2+ radial, DP/PT pulses B/L  Dermatologic: skin warm, dry and intact; rash on lower abdomen  Neurologic: AAOx3; CNII-XII grossly intact; no focal deficits                    RADIOLOGY & ADDITIONAL TESTS:  Reviewed .    MEDICATIONS  (STANDING):  acetaminophen     Tablet .. 975 milliGRAM(s) Oral <User Schedule>  enalapril 5 milliGRAM(s) Oral every 24 hours  enoxaparin Injectable 40 milliGRAM(s) SubCutaneous every 24 hours  ibuprofen  Tablet. 600 milliGRAM(s) Oral every 6 hours  labetalol 400 milliGRAM(s) Oral three times a day  lactated ringers. 1000 milliLiter(s) (125 mL/Hr) IV Continuous <Continuous>  oxytocin Infusion 333.333 milliUNIT(s)/Min (1000 mL/Hr) IV Continuous <Continuous>  zinc oxide 20% Ointment 1 Application(s) Topical daily    MEDICATIONS  (PRN):  diphenhydrAMINE 25 milliGRAM(s) Oral every 6 hours PRN Pruritus  lanolin Ointment 1 Application(s) Topical every 6 hours PRN Sore Nipples  magnesium hydroxide Suspension 30 milliLiter(s) Oral two times a day PRN Constipation  oxyCODONE    IR 5 milliGRAM(s) Oral every 3 hours PRN Moderate to Severe Pain (4-10)  oxyCODONE    IR 5 milliGRAM(s) Oral once PRN Moderate to Severe Pain (4-10)  simethicone 80 milliGRAM(s) Chew every 4 hours PRN Gas     Cardiology Consult    O/N: RON    Interval History/HPI: Pt seen and examined at bedside, NAD, denies chest pain/discomfort/pressure, denies headache, dizziness, blurry vision. ROS unremarkable. Patient endorses feelings of uneasiness and anxiety throughout her hospital stay, specifically in anticipation of her blood pressure readings; these feelings of anxiety tend to heighten during the evening and nighttime. On 07/19, during BP reading of 113/76, pt notes she was sleeping and very calm. Patient expresses desire for discharge.           OBJECTIVE  T(C): 36.8 (07-20-23 @ 06:39), Max: 36.8 (07-19-23 @ 14:10)  HR: 80 (07-20-23 @ 06:39) (80 - 94)  BP: 145/92 (07-20-23 @ 06:39) (113/76 - 154/92)  RR: 18 (07-20-23 @ 06:39) (18 - 18)  SpO2: 100% (07-20-23 @ 06:39) (98% - 100%)      PHYSICAL EXAM:    Constitutional: resting comfortably in bed; NAD  HEENT: NC/AT, EOMI, anicteric sclera, MMM  Neck: supple  Respiratory: CTA B/L; no W/R/R, no retractions  Cardiac: +S1/S2; RRR; no M/R/G  Gastrointestinal: soft, NT/ND; no rebound or guarding; +BSx4  Extremities: WWP, no clubbing or cyanosis; no peripheral edema  Musculoskeletal: NROM x4 grossly, no joint swelling, tenderness or erythema  Vascular: 2+ radial, DP/PT pulses B/L  Dermatologic: skin warm, dry and intact; rash on lower abdomen  Neurologic: AAOx3; CNII-XII grossly intact; no focal deficits                    RADIOLOGY & ADDITIONAL TESTS:  Reviewed .    MEDICATIONS  (STANDING):  acetaminophen     Tablet .. 975 milliGRAM(s) Oral <User Schedule>  enalapril 5 milliGRAM(s) Oral every 24 hours  enoxaparin Injectable 40 milliGRAM(s) SubCutaneous every 24 hours  ibuprofen  Tablet. 600 milliGRAM(s) Oral every 6 hours  labetalol 400 milliGRAM(s) Oral three times a day  lactated ringers. 1000 milliLiter(s) (125 mL/Hr) IV Continuous <Continuous>  oxytocin Infusion 333.333 milliUNIT(s)/Min (1000 mL/Hr) IV Continuous <Continuous>  zinc oxide 20% Ointment 1 Application(s) Topical daily    MEDICATIONS  (PRN):  diphenhydrAMINE 25 milliGRAM(s) Oral every 6 hours PRN Pruritus  lanolin Ointment 1 Application(s) Topical every 6 hours PRN Sore Nipples  magnesium hydroxide Suspension 30 milliLiter(s) Oral two times a day PRN Constipation  oxyCODONE    IR 5 milliGRAM(s) Oral every 3 hours PRN Moderate to Severe Pain (4-10)  oxyCODONE    IR 5 milliGRAM(s) Oral once PRN Moderate to Severe Pain (4-10)  simethicone 80 milliGRAM(s) Chew every 4 hours PRN Gas

## 2023-07-21 LAB
ALBUMIN SERPL ELPH-MCNC: 3.1 G/DL — LOW (ref 3.3–5)
ALBUMIN SERPL ELPH-MCNC: 3.3 G/DL — SIGNIFICANT CHANGE UP (ref 3.3–5)
ALP SERPL-CCNC: 75 U/L — SIGNIFICANT CHANGE UP (ref 40–120)
ALP SERPL-CCNC: 81 U/L — SIGNIFICANT CHANGE UP (ref 40–120)
ALT FLD-CCNC: 11 U/L — SIGNIFICANT CHANGE UP (ref 10–45)
ALT FLD-CCNC: 15 U/L — SIGNIFICANT CHANGE UP (ref 10–45)
ANION GAP SERPL CALC-SCNC: 10 MMOL/L — SIGNIFICANT CHANGE UP (ref 5–17)
ANION GAP SERPL CALC-SCNC: 9 MMOL/L — SIGNIFICANT CHANGE UP (ref 5–17)
AST SERPL-CCNC: 11 U/L — SIGNIFICANT CHANGE UP (ref 10–40)
AST SERPL-CCNC: 16 U/L — SIGNIFICANT CHANGE UP (ref 10–40)
BILIRUB SERPL-MCNC: <0.2 MG/DL — SIGNIFICANT CHANGE UP (ref 0.2–1.2)
BILIRUB SERPL-MCNC: <0.2 MG/DL — SIGNIFICANT CHANGE UP (ref 0.2–1.2)
BUN SERPL-MCNC: 13 MG/DL — SIGNIFICANT CHANGE UP (ref 7–23)
BUN SERPL-MCNC: 14 MG/DL — SIGNIFICANT CHANGE UP (ref 7–23)
CALCIUM SERPL-MCNC: 6.9 MG/DL — LOW (ref 8.4–10.5)
CALCIUM SERPL-MCNC: 7.4 MG/DL — LOW (ref 8.4–10.5)
CHLORIDE SERPL-SCNC: 109 MMOL/L — HIGH (ref 96–108)
CHLORIDE SERPL-SCNC: 110 MMOL/L — HIGH (ref 96–108)
CO2 SERPL-SCNC: 22 MMOL/L — SIGNIFICANT CHANGE UP (ref 22–31)
CO2 SERPL-SCNC: 23 MMOL/L — SIGNIFICANT CHANGE UP (ref 22–31)
CREAT SERPL-MCNC: 0.51 MG/DL — SIGNIFICANT CHANGE UP (ref 0.5–1.3)
CREAT SERPL-MCNC: 0.56 MG/DL — SIGNIFICANT CHANGE UP (ref 0.5–1.3)
EGFR: 124 ML/MIN/1.73M2 — SIGNIFICANT CHANGE UP
EGFR: 127 ML/MIN/1.73M2 — SIGNIFICANT CHANGE UP
GLUCOSE SERPL-MCNC: 104 MG/DL — HIGH (ref 70–99)
GLUCOSE SERPL-MCNC: 108 MG/DL — HIGH (ref 70–99)
HCT VFR BLD CALC: 27.4 % — LOW (ref 34.5–45)
HCT VFR BLD CALC: 28.8 % — LOW (ref 34.5–45)
HGB BLD-MCNC: 9.2 G/DL — LOW (ref 11.5–15.5)
HGB BLD-MCNC: 9.5 G/DL — LOW (ref 11.5–15.5)
MAGNESIUM SERPL-MCNC: 4.4 MG/DL — HIGH (ref 1.6–2.6)
MAGNESIUM SERPL-MCNC: 5.7 MG/DL — HIGH (ref 1.6–2.6)
MAGNESIUM SERPL-MCNC: 5.9 MG/DL — HIGH (ref 1.6–2.6)
MCHC RBC-ENTMCNC: 30 PG — SIGNIFICANT CHANGE UP (ref 27–34)
MCHC RBC-ENTMCNC: 30.4 PG — SIGNIFICANT CHANGE UP (ref 27–34)
MCHC RBC-ENTMCNC: 33 GM/DL — SIGNIFICANT CHANGE UP (ref 32–36)
MCHC RBC-ENTMCNC: 33.6 GM/DL — SIGNIFICANT CHANGE UP (ref 32–36)
MCV RBC AUTO: 90.4 FL — SIGNIFICANT CHANGE UP (ref 80–100)
MCV RBC AUTO: 90.9 FL — SIGNIFICANT CHANGE UP (ref 80–100)
NRBC # BLD: 0 /100 WBCS — SIGNIFICANT CHANGE UP (ref 0–0)
NRBC # BLD: 0 /100 WBCS — SIGNIFICANT CHANGE UP (ref 0–0)
PLATELET # BLD AUTO: 257 K/UL — SIGNIFICANT CHANGE UP (ref 150–400)
PLATELET # BLD AUTO: 307 K/UL — SIGNIFICANT CHANGE UP (ref 150–400)
POTASSIUM SERPL-MCNC: 4 MMOL/L — SIGNIFICANT CHANGE UP (ref 3.5–5.3)
POTASSIUM SERPL-MCNC: 4.3 MMOL/L — SIGNIFICANT CHANGE UP (ref 3.5–5.3)
POTASSIUM SERPL-SCNC: 4 MMOL/L — SIGNIFICANT CHANGE UP (ref 3.5–5.3)
POTASSIUM SERPL-SCNC: 4.3 MMOL/L — SIGNIFICANT CHANGE UP (ref 3.5–5.3)
PROT SERPL-MCNC: 6 G/DL — SIGNIFICANT CHANGE UP (ref 6–8.3)
PROT SERPL-MCNC: 6.4 G/DL — SIGNIFICANT CHANGE UP (ref 6–8.3)
RBC # BLD: 3.03 M/UL — LOW (ref 3.8–5.2)
RBC # BLD: 3.17 M/UL — LOW (ref 3.8–5.2)
RBC # FLD: 13.2 % — SIGNIFICANT CHANGE UP (ref 10.3–14.5)
RBC # FLD: 13.3 % — SIGNIFICANT CHANGE UP (ref 10.3–14.5)
SODIUM SERPL-SCNC: 141 MMOL/L — SIGNIFICANT CHANGE UP (ref 135–145)
SODIUM SERPL-SCNC: 142 MMOL/L — SIGNIFICANT CHANGE UP (ref 135–145)
SURGICAL PATHOLOGY STUDY: SIGNIFICANT CHANGE UP
WBC # BLD: 9.53 K/UL — SIGNIFICANT CHANGE UP (ref 3.8–10.5)
WBC # BLD: 9.54 K/UL — SIGNIFICANT CHANGE UP (ref 3.8–10.5)
WBC # FLD AUTO: 9.53 K/UL — SIGNIFICANT CHANGE UP (ref 3.8–10.5)
WBC # FLD AUTO: 9.54 K/UL — SIGNIFICANT CHANGE UP (ref 3.8–10.5)

## 2023-07-21 PROCEDURE — 93010 ELECTROCARDIOGRAM REPORT: CPT

## 2023-07-21 PROCEDURE — 99232 SBSQ HOSP IP/OBS MODERATE 35: CPT

## 2023-07-21 RX ADMIN — Medication 600 MILLIGRAM(S): at 00:26

## 2023-07-21 RX ADMIN — Medication 975 MILLIGRAM(S): at 14:15

## 2023-07-21 RX ADMIN — Medication 975 MILLIGRAM(S): at 09:20

## 2023-07-21 RX ADMIN — Medication 975 MILLIGRAM(S): at 15:14

## 2023-07-21 RX ADMIN — Medication 400 MILLIGRAM(S): at 14:15

## 2023-07-21 RX ADMIN — Medication 600 MILLIGRAM(S): at 01:26

## 2023-07-21 RX ADMIN — ENOXAPARIN SODIUM 40 MILLIGRAM(S): 100 INJECTION SUBCUTANEOUS at 07:39

## 2023-07-21 RX ADMIN — Medication 50 GM/HR: at 15:52

## 2023-07-21 RX ADMIN — Medication 400 MILLIGRAM(S): at 06:30

## 2023-07-21 RX ADMIN — Medication 600 MILLIGRAM(S): at 07:08

## 2023-07-21 RX ADMIN — Medication 400 MILLIGRAM(S): at 20:28

## 2023-07-21 RX ADMIN — Medication 10 MILLIGRAM(S): at 18:19

## 2023-07-21 RX ADMIN — Medication 975 MILLIGRAM(S): at 03:38

## 2023-07-21 RX ADMIN — Medication 20 MILLIGRAM(S): at 06:29

## 2023-07-21 RX ADMIN — Medication 975 MILLIGRAM(S): at 20:28

## 2023-07-21 RX ADMIN — Medication 600 MILLIGRAM(S): at 06:29

## 2023-07-21 RX ADMIN — Medication 975 MILLIGRAM(S): at 10:00

## 2023-07-21 RX ADMIN — Medication 975 MILLIGRAM(S): at 02:38

## 2023-07-21 NOTE — PROGRESS NOTE ADULT - ASSESSMENT
33 y/o F w PMH PCOS admitted for pregnancy delivery, pregnancy complicated by chorioamnionitis treated w/ ampicillin, clindamycin, and gentamicin, now s/p . Cardiology consulted for treatment of hypertension, unclear if chronic vs. gestational. Per patient's hx of self-reported "white coat syndrome" and consistently high home blood pressure readings after 20 weeks, likely to be gestational. Yesterday evening, patient had two SBPs >165, now with diagnosis of superimposed preeclampsia with severe features; however, on current/revised HTN medication regimen, patient's blood pressures have ranged between 113/77 - 134/87, likely due to increased dose of enalapril 10mg taking full effect. Patient is optimized for discharge from a cardiac standpoint.      Recommendations:    #HTN: unclear whether chronic vs. gestational HTN, per patient's hx of self-reported "white coat syndrome" and consistently high home blood pressure readings after 20 weeks, likely to be gestational; as of  evening, labeled as preeclampsia with severe features per OB team due to two SBPs >160;  Recent SBPs after 6:30pm 0n  have been well below 140, ranging between 113/77 - 134/87; likely due to increased dose of enalapril to 10mg taking effect  - Continue with labetalol 400mg TID  - Continue with enalapril to 10mg qd  - Measure and monitor BP at home 2x/daily on this regimen (morning and evening)      - if symptoms of hypotension occur (dizziness, lightheadedness, weakness), or if SBP < 100, cut in half and only take 5mg qd      - if home SBPs consistently at or > 160s, contact cardiology team  - Will need f/u within 2 weeks of dc w/ Dr. Maria Elena Rehman for BP monitoring and f/u echocardiogram      #Elevated pulmonary artery pressure -  echo demonstrated PA pressure of 32 mmHg; this slight elevation in PA pressure may be a reflection of pregnancy or post-partum cardiac changes as a result of increased fluid volume  - Lasix 20mg PO for 3 days; last dose to be taken tomorrow ()    #HLD  -Hypertriglyceridemia on lipid panel  -Discussed lifestyle modifications including daily 30 min moderate exercise and healthy food habits      Case reviewed w/ attending cardiologist  Plan discussed w/ primary team  Patient is optimized for discharge from a cardiac standpoint  Thank you for the consultation      Tayler Paulino, PGY-1  Internal Medicine Resident  Consult Pager: [phone]

## 2023-07-21 NOTE — PROGRESS NOTE ADULT - ASSESSMENT
A&P:   Pt is a 32y yo s/p pCS , c/b PEC w/ SF, seen for a magnesium check.       1. Preeclampsia: Continue IV Magnesium @2G/hr for 24 hrs  Endorsing mild headache  Antihypertensives: Labetalol 400mg TID, enalipril 10mg daily, lasix 20 mg daily    Continue to monitor blood pressures.   Next Magnesium check @ 0730  Last Magnesium serum level 4.4. Follow up next level.     2. GI: regular diet    3. : strict Is and Os, D/C hdez after discontinuation of IV Magnesium

## 2023-07-21 NOTE — PROGRESS NOTE ADULT - SUBJECTIVE AND OBJECTIVE BOX
Patient evaluated at bedside for clinical magnesium check. Serum magnesium to be drawn at 1330.  She denies visual disturbances including scotoma, headache, and right upper quadrant pain. Also denies nausea/vomiting/epigastric pain/shortness of breath. Pain well controlled.      T(C): 36.7 (07-21-23 @ 12:00), Max: 37 (07-21-23 @ 02:25)  HR: 73 (07-21-23 @ 12:00) (72 - 77)  BP: 122/85 (07-21-23 @ 12:00) (113/77 - 125/82)  RR: 17 (07-21-23 @ 12:00) (17 - 18)  SpO2: 95% (07-21-23 @ 12:00) (95% - 98%)  Wt(kg): --  Daily     Daily     07-20 @ 07:01  -  07-21 @ 07:00  --------------------------------------------------------  IN: 1450 mL / OUT: 1800 mL / NET: -350 mL    07-21 @ 07:01  -  07-21 @ 13:44  --------------------------------------------------------  IN: 0 mL / OUT: 1050 mL / NET: -1050 mL      Gen: no apparent distress  CV: regular rate and rhythm; no murmurs  Pulm: no increased work of breathing; clear to auscultation bilaterally  Abd: soft, nontender, no rebound or guarding, no epigastric tenderness, liver nonpalpable +BS, fundus palpated   : Lazo in place  Ext: trace pedal edema bilaterally; Reflexes 2+ and symmetric diffusely                          9.5    9.54  )-----------( 307      ( 21 Jul 2023 13:03 )             28.8     07-21    x   |  x   |  13  ----------------------------<  108<H>  x    |  23  |  0.56    Ca    7.4<L>      21 Jul 2023 01:22  Mg     5.9     07-21    TPro  6.0  /  Alb  3.1<L>  /  TBili  <0.2  /  DBili  x   /  AST  11  /  ALT  11  /  AlkPhos  75  07-21    acetaminophen     Tablet .. 975 milliGRAM(s) Oral <User Schedule>  diphenhydrAMINE 25 milliGRAM(s) Oral every 6 hours PRN  enalapril 10 milliGRAM(s) Oral every 24 hours  enoxaparin Injectable 40 milliGRAM(s) SubCutaneous every 24 hours  furosemide    Tablet 20 milliGRAM(s) Oral daily  ibuprofen  Tablet. 600 milliGRAM(s) Oral every 6 hours  labetalol 400 milliGRAM(s) Oral three times a day  lactated ringers. 1000 milliLiter(s) IV Continuous <Continuous>  lanolin Ointment 1 Application(s) Topical every 6 hours PRN  magnesium hydroxide Suspension 30 milliLiter(s) Oral two times a day PRN  magnesium sulfate Infusion 2 Gm/Hr IV Continuous <Continuous>  oxyCODONE    IR 5 milliGRAM(s) Oral once PRN  oxyCODONE    IR 5 milliGRAM(s) Oral every 3 hours PRN  oxytocin Infusion 333.333 milliUNIT(s)/Min IV Continuous <Continuous>  simethicone 80 milliGRAM(s) Chew every 4 hours PRN  zinc oxide 20% Ointment 1 Application(s) Topical daily      07-20-23 @ 07:01 - 07-21-23 @ 07:00  --------------------------------------------------------  IN: 1450 mL / OUT: 1800 mL / NET: -350 mL    07-21-23 @ 07:01 - 07-21-23 @ 13:44  --------------------------------------------------------  IN: 0 mL / OUT: 1050 mL / NET: -1050 mL                     Patient evaluated at bedside for clinical magnesium check. Serum magnesium to be drawn at 1330.  She denies visual disturbances including scotoma, headache, and right upper quadrant pain. Also denies nausea/vomiting/epigastric pain/shortness of breath. Pain well controlled.      T(C): 36.7 (07-21-23 @ 12:00), Max: 37 (07-21-23 @ 02:25)  HR: 73 (07-21-23 @ 12:00) (72 - 77)  BP: 122/85 (07-21-23 @ 12:00) (113/77 - 125/82)  RR: 17 (07-21-23 @ 12:00) (17 - 18)  SpO2: 95% (07-21-23 @ 12:00) (95% - 98%)  Wt(kg): --  Daily     Daily     07-20 @ 07:01  -  07-21 @ 07:00  --------------------------------------------------------  IN: 1450 mL / OUT: 1800 mL / NET: -350 mL    07-21 @ 07:01  -  07-21 @ 13:44  --------------------------------------------------------  IN: 0 mL / OUT: 1050 mL / NET: -1050 mL      Gen: no apparent distress  CV: regular rate and rhythm; no murmurs  Pulm: no increased work of breathing; clear to auscultation bilaterally  Abd: soft, nontender, no rebound or guarding, no epigastric tenderness, liver nonpalpable +BS, fundus palpated   Ext: trace pedal edema bilaterally; Reflexes 2+ and symmetric diffusely                          9.5    9.54  )-----------( 307      ( 21 Jul 2023 13:03 )             28.8     07-21    x   |  x   |  13  ----------------------------<  108<H>  x    |  23  |  0.56    Ca    7.4<L>      21 Jul 2023 01:22  Mg     5.9     07-21    TPro  6.0  /  Alb  3.1<L>  /  TBili  <0.2  /  DBili  x   /  AST  11  /  ALT  11  /  AlkPhos  75  07-21    acetaminophen     Tablet .. 975 milliGRAM(s) Oral <User Schedule>  diphenhydrAMINE 25 milliGRAM(s) Oral every 6 hours PRN  enalapril 10 milliGRAM(s) Oral every 24 hours  enoxaparin Injectable 40 milliGRAM(s) SubCutaneous every 24 hours  furosemide    Tablet 20 milliGRAM(s) Oral daily  ibuprofen  Tablet. 600 milliGRAM(s) Oral every 6 hours  labetalol 400 milliGRAM(s) Oral three times a day  lactated ringers. 1000 milliLiter(s) IV Continuous <Continuous>  lanolin Ointment 1 Application(s) Topical every 6 hours PRN  magnesium hydroxide Suspension 30 milliLiter(s) Oral two times a day PRN  magnesium sulfate Infusion 2 Gm/Hr IV Continuous <Continuous>  oxyCODONE    IR 5 milliGRAM(s) Oral once PRN  oxyCODONE    IR 5 milliGRAM(s) Oral every 3 hours PRN  oxytocin Infusion 333.333 milliUNIT(s)/Min IV Continuous <Continuous>  simethicone 80 milliGRAM(s) Chew every 4 hours PRN  zinc oxide 20% Ointment 1 Application(s) Topical daily      07-20-23 @ 07:01  -  07-21-23 @ 07:00  --------------------------------------------------------  IN: 1450 mL / OUT: 1800 mL / NET: -350 mL    07-21-23 @ 07:01 - 07-21-23 @ 13:44  --------------------------------------------------------  IN: 0 mL / OUT: 1050 mL / NET: -1050 mL

## 2023-07-21 NOTE — PROGRESS NOTE ADULT - ASSESSMENT
A&P:  31 yo  s/p primary C/S after failed IOL for chronic HTN  No complaints currently. No severe range BP.  Antihypertensives: enalapril 10 milliGRAM(s) Oral every 24 hours; furosemide 20 milliGRAM(s) Oral daily; labetalol 400 milliGRAM(s) Oral three times a day  Mg level is pending    - Continue IV Magnesium @2g/hr until 1930 on 23  - Continue to monitor blood pressures  - Follow up on Magnesium serum levels  - GI: Regular diet  - : voiding spontaneously

## 2023-07-21 NOTE — PROGRESS NOTE ADULT - SUBJECTIVE AND OBJECTIVE BOX
Patient evaluated at bedside.   She reports pain is well controlled with OPM.  She has been ambulating with assistance (while on IV Mg), voiding spontaneously, passing gas, tolerating regular diet.  She has a 5-6/10 HA this morning.  She denies dizziness, CP, palpitations, SOB, RUQ/epigastric pain, n/v, or heavy vaginal bleeding.    Physical Exam:  Vital Signs Last 24 Hrs  T(C): 36.7 (21 Jul 2023 06:25), Max: 37 (21 Jul 2023 02:25)  T(F): 98.1 (21 Jul 2023 06:25), Max: 98.6 (21 Jul 2023 02:25)  HR: 74 (21 Jul 2023 06:25) (74 - 91)  BP: 124/85 (21 Jul 2023 06:25) (117/74 - 169/103)  BP(mean): 88 (21 Jul 2023 06:00) (88 - 98)  RR: 18 (21 Jul 2023 06:25) (17 - 19)  SpO2: 97% (21 Jul 2023 06:25) (97% - 100%)    Parameters below as of 21 Jul 2023 06:25  Patient On (Oxygen Delivery Method): room air        Gen: NAD  Abd: + BS, soft, nontender, nondistended, no rebound or guarding  Incision clean, dry and intact  uterus firm at midline  No RUQ/epigastric tenderness  : lochia WNL  Extremities: no swelling or calf tenderness                          9.2    9.53  )-----------( 257      ( 21 Jul 2023 01:22 )             27.4     07-21    142  |  110<H>  |  14  ----------------------------<  104<H>  4.0   |  22  |  0.51    Ca    7.4<L>      21 Jul 2023 01:22  Mg     4.4     07-21    TPro  6.0  /  Alb  3.1<L>  /  TBili  <0.2  /  DBili  x   /  AST  11  /  ALT  11  /  AlkPhos  75  07-21    Magnesium: 4.4 mg/dL (07-21 @ 01:22)

## 2023-07-21 NOTE — PROGRESS NOTE ADULT - ASSESSMENT
32y Female POD#6 s/p C/S, c/b cHTN and chorioamnionitis   - siPEC with SF (BPs) - On IV Mg 2g/hr started 19:30 7/20. Most recent Mg level 4.4. 6/10 HA currently. Will f/u 7:30am Mg check.   - Continue BP medication today from Labetalol 300mg TID and Enalapril 5mg qd.  Lasix 20mg qd.   - Cardiology consulted, f/u recs today.   - Chorioamnionitis: afebrile, s/p IV A/G/C.                      - Neuro/Pain: motrin atc, tylenol atc, oxy prn  - CV:  VSq4h  - Pulm: Encourage ISS & Ambulation  - GI: Advance as tolerated  - : Voiding spontaneously  - DVT ppx: SCDs, Lovenox 40mg QD  - Dispo: when BP well controlled

## 2023-07-21 NOTE — PROGRESS NOTE ADULT - SUBJECTIVE AND OBJECTIVE BOX
Cardiology Consult    O/N: At 6:30pm on 07/19, pt had two BP reads of 169/103 and 168/105. Pt was given 10mg IV hydralazine and started on 24 hr IV Magnesium sulfate for new diagnosis of superimposed preeclampsia with severe features.     Interval History/HPI: Pt seen and examined at bedside, NAD, denies chest pain/discomfort/pressure, denies dizziness, blurry vision. Patient endorses 6/10 headache, attributing it to the magnesium. During yesterday evening's high BPs, patient notes to have had a dull, 4/10 headache, and chest pressure, partially attributing it to anxiety in anticipation of blood pressure reading. ROS unremarkable otherwise.         OBJECTIVE  T(C): 36.7 (07-21-23 @ 12:00), Max: 37 (07-21-23 @ 02:25)  HR: 73 (07-21-23 @ 12:00) (72 - 91)  BP: 122/85 (07-21-23 @ 12:00) (113/77 - 169/103)  RR: 17 (07-21-23 @ 12:00) (17 - 19)  SpO2: 95% (07-21-23 @ 12:00) (95% - 100%)    07-20-23 @ 07:01  -  07-21-23 @ 07:00  --------------------------------------------------------  IN: 1450 mL / OUT: 1800 mL / NET: -350 mL    07-21-23 @ 07:01  -  07-21-23 @ 14:00  --------------------------------------------------------  IN: 0 mL / OUT: 1050 mL / NET: -1050 mL        PHYSICAL EXAM:    Constitutional: resting comfortably in bed; NAD  HEENT: NC/AT, EOMI, anicteric sclera, MMM  Neck: supple  Respiratory: CTA B/L; no W/R/R, no retractions  Cardiac: +S1/S2; RRR; no M/R/G  Gastrointestinal: soft, NT/ND; no rebound or guarding; +BSx4  Extremities: WWP, no clubbing or cyanosis; no peripheral edema  Musculoskeletal: NROM x4 grossly, no joint swelling, tenderness or erythema  Vascular: 2+ radial, DP/PT pulses B/L  Dermatologic: skin warm, dry and intact; rash on lower abdomen  Neurologic: AAOx3; CNII-XII grossly intact; no focal deficits    LABS:                        9.5    9.54  )-----------( 307      ( 21 Jul 2023 13:03 )             28.8     07-21    141  |  109<H>  |  13  ----------------------------<  108<H>  4.3   |  23  |  0.56    Ca    6.9<L>      21 Jul 2023 13:03  Mg     5.7     07-21    TPro  6.4  /  Alb  3.3  /  TBili  <0.2  /  DBili  x   /  AST  16  /  ALT  15  /  AlkPhos  81  07-21      Urinalysis Basic - ( 21 Jul 2023 13:03 )    Color: x / Appearance: x / SG: x / pH: x  Gluc: 108 mg/dL / Ketone: x  / Bili: x / Urobili: x   Blood: x / Protein: x / Nitrite: x   Leuk Esterase: x / RBC: x / WBC x   Sq Epi: x / Non Sq Epi: x / Bacteria: x        RADIOLOGY & ADDITIONAL TESTS:  Reviewed .    MEDICATIONS  (STANDING):  acetaminophen     Tablet .. 975 milliGRAM(s) Oral <User Schedule>  enalapril 10 milliGRAM(s) Oral every 24 hours  enoxaparin Injectable 40 milliGRAM(s) SubCutaneous every 24 hours  furosemide    Tablet 20 milliGRAM(s) Oral daily  ibuprofen  Tablet. 600 milliGRAM(s) Oral every 6 hours  labetalol 400 milliGRAM(s) Oral three times a day  lactated ringers. 1000 milliLiter(s) (125 mL/Hr) IV Continuous <Continuous>  magnesium sulfate Infusion 2 Gm/Hr (50 mL/Hr) IV Continuous <Continuous>  oxytocin Infusion 333.333 milliUNIT(s)/Min (1000 mL/Hr) IV Continuous <Continuous>  zinc oxide 20% Ointment 1 Application(s) Topical daily    MEDICATIONS  (PRN):  diphenhydrAMINE 25 milliGRAM(s) Oral every 6 hours PRN Pruritus  lanolin Ointment 1 Application(s) Topical every 6 hours PRN Sore Nipples  magnesium hydroxide Suspension 30 milliLiter(s) Oral two times a day PRN Constipation  oxyCODONE    IR 5 milliGRAM(s) Oral every 3 hours PRN Moderate to Severe Pain (4-10)  oxyCODONE    IR 5 milliGRAM(s) Oral once PRN Moderate to Severe Pain (4-10)  simethicone 80 milliGRAM(s) Chew every 4 hours PRN Gas

## 2023-07-21 NOTE — PROGRESS NOTE ADULT - ASSESSMENT
A&P:  31 yo  s/p pCS after failed IOL for chronic HTN, B-balbuena suture placed during case ()  Currently experiencing headache, improving; denies toxic symptoms. No severe range BP.  Antihypertensives: enalapril 10 milliGRAM(s) Oral every 24 hours; furosemide 20 milliGRAM(s) Oral daily; labetalol 400 milliGRAM(s) Oral three times a day  Mg level is pending    - Continue IV Magnesium @2g/hr until 1930 on   - Continue to monitor blood pressures  - Follow up on Magnesium serum levels. Next Magnesium check @ 0730, 1330  - GI: regular diet  - : voiding spontaneously

## 2023-07-22 RX ORDER — HYDRALAZINE HCL 50 MG
10 TABLET ORAL ONCE
Refills: 0 | Status: COMPLETED | OUTPATIENT
Start: 2023-07-22 | End: 2023-07-22

## 2023-07-22 RX ADMIN — Medication 975 MILLIGRAM(S): at 20:43

## 2023-07-22 RX ADMIN — Medication 10 MILLIGRAM(S): at 06:26

## 2023-07-22 RX ADMIN — Medication 10 MILLIGRAM(S): at 12:54

## 2023-07-22 RX ADMIN — ENOXAPARIN SODIUM 40 MILLIGRAM(S): 100 INJECTION SUBCUTANEOUS at 09:37

## 2023-07-22 RX ADMIN — SIMETHICONE 80 MILLIGRAM(S): 80 TABLET, CHEWABLE ORAL at 06:23

## 2023-07-22 RX ADMIN — SIMETHICONE 80 MILLIGRAM(S): 80 TABLET, CHEWABLE ORAL at 20:43

## 2023-07-22 RX ADMIN — Medication 400 MILLIGRAM(S): at 13:30

## 2023-07-22 RX ADMIN — Medication 975 MILLIGRAM(S): at 05:47

## 2023-07-22 RX ADMIN — Medication 975 MILLIGRAM(S): at 09:34

## 2023-07-22 RX ADMIN — Medication 1 APPLICATION(S): at 05:53

## 2023-07-22 RX ADMIN — Medication 20 MILLIGRAM(S): at 05:46

## 2023-07-22 RX ADMIN — Medication 400 MILLIGRAM(S): at 05:46

## 2023-07-22 RX ADMIN — Medication 975 MILLIGRAM(S): at 13:33

## 2023-07-22 RX ADMIN — Medication 0.5 MILLIGRAM(S): at 23:15

## 2023-07-22 RX ADMIN — Medication 400 MILLIGRAM(S): at 20:47

## 2023-07-22 RX ADMIN — Medication 0.5 MILLIGRAM(S): at 14:46

## 2023-07-22 NOTE — CHART NOTE - NSCHARTNOTEFT_GEN_A_CORE
The patient had two severe BPs 15 min apart. 175/111 and 167/. 10 mg hydralazine pushed.   Pt denies toxic symptoms. Will check BP 20 min after the hydralazine push.   Pt is extremely anxious, every time that she feels anxious her BP is elevated.   Will consider to start ativan.   D/w Dr. Driver. The patient had two severe BPs 15 min apart. 175/111 and 166/114. 10 mg hydralazine pushed.   Pt denies toxic symptoms. Will check BP 20 min after the hydralazine push.   Pt is extremely anxious, every time that she feels anxious her BP is elevated.   Will consider to start ativan.   D/w Dr. Driver.

## 2023-07-22 NOTE — PROGRESS NOTE ADULT - ASSESSMENT
A/P: 32y s/p  section, POD#8, with two severe range BP   -  Pain: PO motrin q6hrs, tylenol q8hrs, oxycodone for severe pain PRN  -  Post-operatively labs: post-op Hgb , hemodynamically stable, no symptoms of anemia   -  GI: tolerating regular diet, passing gas  -  : s/p hdez , urinating without difficulty  -  DVT prophylaxis: encouraged increased ambulation  - s/p IVP 10mg hydralazine will reassess in 20 minutes  A/P: 32y s/p  section, POD#8, with two severe range BP   -  Pain: PO motrin q6hrs, tylenol q8hrs, oxycodone for severe pain PRN  -  Post-operatively labs: post-op Hgb , hemodynamically stable, no symptoms of anemia   -  GI: tolerating regular diet, passing gas  -  : s/p hdez , urinating without difficulty  -  DVT prophylaxis: encouraged increased ambulation  - s/p IVP 10mg hydralazine will reassess in 20 minutes       Addendum: repeat pressure 131/82   A/P: 32y s/p  section, POD#8, with two severe range BP   -  Pain: PO motrin q6hrs, tylenol q8hrs, oxycodone for severe pain PRN  -  Post-operatively labs: post-op Hgb , hemodynamically stable, no symptoms of anemia   -  GI: tolerating regular diet, passing gas  -  : s/p hdez , urinating without difficulty  -  DVT prophylaxis: encouraged increased ambulation  - s/p IVP 10mg hydralazine will reassess in 20 minutes       Addendum: repeat pressure 132/86   A/P: 32y s/p  section, POD#8, -  Pain: PO motrin q6hrs, tylenol q8hrs, oxycodone for severe pain PRN  -  Post-operatively labs: post-op Hgb , hemodynamically stable, no symptoms of anemia   -  GI: tolerating regular diet, passing gas  -  : s/p hdez , urinating without difficulty  -  DVT prophylaxis: encouraged increased ambulation  - s/p IVP 10mg hydralazine will reassess in 20 minutes     PEC w/ SF (BP)   -s/p hydralazine 10mg IVP on , repeat pressure 132/86  -Antihypertensives: Labetalol 400mg TID, Enalapril 10mg QD, Lasix 20 mg QD for three days  -will change timing of enalapril to 00:00

## 2023-07-22 NOTE — PROGRESS NOTE ADULT - SUBJECTIVE AND OBJECTIVE BOX
Called to bedside by RN for two severe range BP (164/96 and 168/110). Patient with no toxic complaints. Pushed 10mg hydralazine, will re check pressure in 20 minutes. Also seen for post-op check.   Patient reports pain is well controlled with oral pain medications.   She denies headache, dizziness, chest pain, palpitations, shortness of breath, nausea, vomiting or heavy vaginal bleeding.  She has been ambulating without assistance, voiding spontaneously, passing gas, has had loose stools and is tolerating regular diet.     Physical Exam:  Vital Signs Last 24 Hrs  T(C): 36.7 (22 Jul 2023 05:50), Max: 37.2 (22 Jul 2023 00:05)  T(F): 98 (22 Jul 2023 05:50), Max: 99 (22 Jul 2023 00:05)  HR: 88 (22 Jul 2023 06:05) (72 - 90)  BP: 168/110 (22 Jul 2023 06:05) (113/77 - 168/110)  BP(mean): 103 (21 Jul 2023 14:00) (89 - 103)  RR: 19 (22 Jul 2023 06:05) (16 - 19)  SpO2: 97% (22 Jul 2023 06:05) (95% - 99%)    Parameters below as of 22 Jul 2023 00:05  Patient On (Oxygen Delivery Method): room air        GA: NAD, A+0 x 3  Pulm: no increased WOB  Abd: soft, nontender, nondistended, no rebound or guarding, incision clean, dry and intact, uterus firm at midline, 2 fb below umbilicus  Extremities: no swelling or calf tenderness                             9.5    9.54  )-----------( 307      ( 21 Jul 2023 13:03 )             28.8     07-21    141  |  109<H>  |  13  ----------------------------<  108<H>  4.3   |  23  |  0.56    Ca    6.9<L>      21 Jul 2023 13:03  Mg     5.7     07-21    TPro  6.4  /  Alb  3.3  /  TBili  <0.2  /  DBili  x   /  AST  16  /  ALT  15  /  AlkPhos  81  07-21         Called to bedside by RN for two severe range BP (164/96 and 168/110). Patient with no toxic complaints. Pushed 10mg hydralazine, will re check pressure in 20 minutes. Also seen for post-op check.   Patient reports pain is well controlled with oral pain medications.   She denies headache, dizziness, chest pain, palpitations, shortness of breath, nausea, vomiting or heavy vaginal bleeding.  She has been ambulating without assistance, voiding spontaneously, passing gas, has had loose stools and is tolerating regular diet.     Physical Exam:  Vital Signs Last 24 Hrs  T(C): 36.7 (22 Jul 2023 05:50), Max: 37.2 (22 Jul 2023 00:05)  T(F): 98 (22 Jul 2023 05:50), Max: 99 (22 Jul 2023 00:05)  HR: 88 (22 Jul 2023 06:05) (72 - 90)  BP: 168/110 (22 Jul 2023 06:05) (113/77 - 168/110)  BP(mean): 103 (21 Jul 2023 14:00) (89 - 103)  RR: 19 (22 Jul 2023 06:05) (16 - 19)  SpO2: 97% (22 Jul 2023 06:05) (95% - 99%)    Parameters below as of 22 Jul 2023 00:05  Patient On (Oxygen Delivery Method): room air        GA: NAD, A+0 x 3  Pulm: no increased WOB  Abd: soft, nontender, nondistended, no rebound or guarding, incision clean, dry and intact, uterus firm at midline, 2 fb below umbilicus  Extremities: no swelling or calf tenderness                             9.5    9.54  )-----------( 307      ( 21 Jul 2023 13:03 )             28.8     07-21    141  |  109<H>  |  13  ----------------------------<  108<H>  4.3   |  23  |  0.56    Ca    6.9<L>      21 Jul 2023 13:03  Mg     5.7     07-21    TPro  6.4  /  Alb  3.3  /  TBili  <0.2  /  DBili  x   /  AST  16  /  ALT  15  /  AlkPhos  81  07-21

## 2023-07-23 PROCEDURE — 99233 SBSQ HOSP IP/OBS HIGH 50: CPT

## 2023-07-23 RX ORDER — NIFEDIPINE 30 MG
30 TABLET, EXTENDED RELEASE 24 HR ORAL EVERY 24 HOURS
Refills: 0 | Status: DISCONTINUED | OUTPATIENT
Start: 2023-07-23 | End: 2023-07-24

## 2023-07-23 RX ORDER — HYDRALAZINE HCL 50 MG
10 TABLET ORAL ONCE
Refills: 0 | Status: COMPLETED | OUTPATIENT
Start: 2023-07-23 | End: 2023-07-23

## 2023-07-23 RX ADMIN — Medication 975 MILLIGRAM(S): at 22:30

## 2023-07-23 RX ADMIN — Medication 600 MILLIGRAM(S): at 11:59

## 2023-07-23 RX ADMIN — Medication 400 MILLIGRAM(S): at 22:06

## 2023-07-23 RX ADMIN — Medication 10 MILLIGRAM(S): at 00:39

## 2023-07-23 RX ADMIN — ENOXAPARIN SODIUM 40 MILLIGRAM(S): 100 INJECTION SUBCUTANEOUS at 09:14

## 2023-07-23 RX ADMIN — Medication 400 MILLIGRAM(S): at 14:27

## 2023-07-23 RX ADMIN — Medication 0.5 MILLIGRAM(S): at 13:04

## 2023-07-23 RX ADMIN — Medication 2 CAPSULE(S): at 15:59

## 2023-07-23 RX ADMIN — Medication 10 MILLIGRAM(S): at 18:17

## 2023-07-23 RX ADMIN — Medication 10 MILLIGRAM(S): at 09:10

## 2023-07-23 RX ADMIN — Medication 975 MILLIGRAM(S): at 22:05

## 2023-07-23 RX ADMIN — Medication 400 MILLIGRAM(S): at 04:40

## 2023-07-23 RX ADMIN — Medication 975 MILLIGRAM(S): at 09:13

## 2023-07-23 RX ADMIN — Medication 30 MILLIGRAM(S): at 09:17

## 2023-07-23 RX ADMIN — Medication 2 CAPSULE(S): at 15:11

## 2023-07-23 NOTE — CHART NOTE - NSCHARTNOTEFT_GEN_A_CORE
Notified by nursing the patient had 2 severe range BPs >160/110 that were 15 mins apart. 10 mg hydralazine was pushed. Patient denies toxic symptoms. Will check BP in 20 minutes after push.  Patient is tearful having to experience these multiple pushes over the last few days.  Repeat BP after 20 mins is 149/90.  D/w Dr. Rivera Depressed/Anxious/Irritable

## 2023-07-23 NOTE — PROGRESS NOTE ADULT - SUBJECTIVE AND OBJECTIVE BOX
INTERVAL EVENTS:    PAST MEDICAL & SURGICAL HISTORY:      MEDICATIONS  (STANDING):  acetaminophen     Tablet .. 975 milliGRAM(s) Oral <User Schedule>  enalapril 10 milliGRAM(s) Oral every 24 hours  enoxaparin Injectable 40 milliGRAM(s) SubCutaneous every 24 hours  hydrALAZINE Injectable 10 milliGRAM(s) IV Push once  ibuprofen  Tablet. 600 milliGRAM(s) Oral every 6 hours  labetalol 400 milliGRAM(s) Oral three times a day  lactated ringers. 1000 milliLiter(s) (125 mL/Hr) IV Continuous <Continuous>  LORazepam     Tablet 0.5 milliGRAM(s) Oral every 24 hours  magnesium sulfate Infusion 2 Gm/Hr (50 mL/Hr) IV Continuous <Continuous>  NIFEdipine XL 30 milliGRAM(s) Oral every 24 hours  oxytocin Infusion 333.333 milliUNIT(s)/Min (1000 mL/Hr) IV Continuous <Continuous>  zinc oxide 20% Ointment 1 Application(s) Topical daily    MEDICATIONS  (PRN):  diphenhydrAMINE 25 milliGRAM(s) Oral every 6 hours PRN Pruritus  lanolin Ointment 1 Application(s) Topical every 6 hours PRN Sore Nipples  magnesium hydroxide Suspension 30 milliLiter(s) Oral two times a day PRN Constipation  simethicone 80 milliGRAM(s) Chew every 4 hours PRN Gas    T(F): 98.3 (07-23-23 @ 04:30), Max: 98.6 (07-22-23 @ 15:40)  HR: 82 (07-23-23 @ 10:25) (73 - 92)  BP: 119/78 (07-23-23 @ 10:25) (114/69 - 175/111)  BP(mean): 92 (07-23-23 @ 10:25) (92 - 131)  ABP: --  ABP(mean): --  RR: 17 (07-23-23 @ 10:25) (17 - 19)  SpO2: 98% (07-23-23 @ 04:30) (96% - 99%)    I/O Detail 24H      PHYSICAL EXAM:  GEN: NAD  HEENT: EOMI   RESP: CTA b/l  CV: RRR. Normal S1/S2. No m/r/g.  ABD: soft, non-distended  EXT: No edema   NEURO: alert and attentive    LABS:  CBC 07-21-23 @ 13:03                        9.5    9.54  )-----------( 307                   28.8       Hgb trend: 9.5 <-- , 9.2 <--   WBC trend: 9.54 <-- , 9.53 <--       CMP 07-21-23 @ 13:03    141  |  109<H>  |  13  ----------------------------<  108<H>  4.3   |  23  |  0.56    Mg     5.7     07-21    TPro  6.4  /  Alb  3.3  /  TBili  <0.2  /  DBili  x   /  AST  16  /  ALT  15  /  AlkPhos  81  07-21      Serum Cr trend: 0.56 <-- , 0.51 <--         Cardiac Markers           STUDIES:           INTERVAL EVENTS: patient with elevated BPs and anxious, requiring hydralazine IVP and started on nifedipine PO by primary team    PAST MEDICAL & SURGICAL HISTORY:      MEDICATIONS  (STANDING):  acetaminophen     Tablet .. 975 milliGRAM(s) Oral <User Schedule>  enalapril 10 milliGRAM(s) Oral every 24 hours  enoxaparin Injectable 40 milliGRAM(s) SubCutaneous every 24 hours  hydrALAZINE Injectable 10 milliGRAM(s) IV Push once  ibuprofen  Tablet. 600 milliGRAM(s) Oral every 6 hours  labetalol 400 milliGRAM(s) Oral three times a day  lactated ringers. 1000 milliLiter(s) (125 mL/Hr) IV Continuous <Continuous>  LORazepam     Tablet 0.5 milliGRAM(s) Oral every 24 hours  magnesium sulfate Infusion 2 Gm/Hr (50 mL/Hr) IV Continuous <Continuous>  NIFEdipine XL 30 milliGRAM(s) Oral every 24 hours  oxytocin Infusion 333.333 milliUNIT(s)/Min (1000 mL/Hr) IV Continuous <Continuous>  zinc oxide 20% Ointment 1 Application(s) Topical daily    MEDICATIONS  (PRN):  diphenhydrAMINE 25 milliGRAM(s) Oral every 6 hours PRN Pruritus  lanolin Ointment 1 Application(s) Topical every 6 hours PRN Sore Nipples  magnesium hydroxide Suspension 30 milliLiter(s) Oral two times a day PRN Constipation  simethicone 80 milliGRAM(s) Chew every 4 hours PRN Gas    T(F): 98.3 (07-23-23 @ 04:30), Max: 98.6 (07-22-23 @ 15:40)  HR: 82 (07-23-23 @ 10:25) (73 - 92)  BP: 119/78 (07-23-23 @ 10:25) (114/69 - 175/111)  BP(mean): 92 (07-23-23 @ 10:25) (92 - 131)  ABP: --  ABP(mean): --  RR: 17 (07-23-23 @ 10:25) (17 - 19)  SpO2: 98% (07-23-23 @ 04:30) (96% - 99%)    I/O Detail 24H      PHYSICAL EXAM:  GEN: NAD  HEENT: EOMI   RESP: CTA b/l  CV: RRR. Normal S1/S2. No m/r/g.  ABD: soft, non-distended  EXT: No edema   NEURO: alert and attentive    LABS:  CBC 07-21-23 @ 13:03                        9.5    9.54  )-----------( 307                   28.8       Hgb trend: 9.5 <-- , 9.2 <--   WBC trend: 9.54 <-- , 9.53 <--       CMP 07-21-23 @ 13:03    141  |  109<H>  |  13  ----------------------------<  108<H>  4.3   |  23  |  0.56    Mg     5.7     07-21    TPro  6.4  /  Alb  3.3  /  TBili  <0.2  /  DBili  x   /  AST  16  /  ALT  15  /  AlkPhos  81  07-21      Serum Cr trend: 0.56 <-- , 0.51 <--         Cardiac Markers           STUDIES:

## 2023-07-23 NOTE — PROGRESS NOTE ADULT - SUBJECTIVE AND OBJECTIVE BOX
Patient evaluated at bedside this morning, resting comfortable in bed.   She reports pain is well controlled.  She denies headache, scotoma, RUQ/epigastric pain, dizziness, chest pain, palpitations, shortness of breathe, nausea, vomiting or heavy vaginal bleeding.  She has been ambulating without assistance, voiding spontaneously, passing gas, tolerating regular diet and is breastfeeding.    Physical Exam:  Vital Signs Last 24 Hrs  T(C): 36.8 (23 Jul 2023 04:30), Max: 37 (22 Jul 2023 15:40)  T(F): 98.3 (23 Jul 2023 04:30), Max: 98.6 (22 Jul 2023 15:40)  HR: 73 (23 Jul 2023 04:30) (73 - 92)  BP: 153/86 (23 Jul 2023 04:30) (114/69 - 175/111)  BP(mean): 97 (22 Jul 2023 14:00) (97 - 101)  RR: 18 (23 Jul 2023 04:30) (17 - 19)  SpO2: 98% (23 Jul 2023 04:30) (96% - 99%)    Parameters below as of 23 Jul 2023 04:30  Patient On (Oxygen Delivery Method): room air        GA: NAD, A+0 x 3  Abd: soft, nontender, nondistended, no rebound or guarding, incision clean, dry and intact, uterus firm at midline and below umbilicus  : lochia WNL  Extremities: no swelling or calf tenderness                             9.5    9.54  )-----------( 307      ( 21 Jul 2023 13:03 )             28.8     07-21    141  |  109<H>  |  13  ----------------------------<  108<H>  4.3   |  23  |  0.56    Ca    6.9<L>      21 Jul 2023 13:03  Mg     5.7     07-21    TPro  6.4  /  Alb  3.3  /  TBili  <0.2  /  DBili  x   /  AST  16  /  ALT  15  /  AlkPhos  81  07-21

## 2023-07-23 NOTE — PROGRESS NOTE ADULT - ASSESSMENT
32y Female POD#9 s/p C/S, c/b cHTN and chorioamnionitis   - siPEC with SF (BPs) - s/p IV Mag  - Continue BP medication today Labetalol 400 TID, Enalapril 10 QD  - Chorioamnionitis: afebrile, s/p IV A/G/C.            - Anxiety - Ativan 0.5mg QD             - Neuro/Pain: motrin atc, tylenol atc, oxy prn  - CV:  VSq4h  - Pulm: Encourage ISS & Ambulation  - GI: Advance as tolerated  - : Voiding spontaneously  - DVT ppx: SCDs, Lovenox 40mg QD  - Dispo: when BP well controlled

## 2023-07-23 NOTE — PROGRESS NOTE ADULT - ASSESSMENT
33 y/o F w PMH PCOS admitted for pregnancy delivery, pregnancy complicated by chorioamnionitis treated w/ ampicillin, clindamycin, and gentamicin, now s/p . Cardiology consulted for treatment of hypertension, no with pre-eclampsia with severe features.      Recommendations:    #Pre-eclampsia with severe features   Patient noted to be hypertensive SBP >160s this morning requiring IV hydralazine 10mg (has required 20mg IVP total in last 24 hours), and then given nifedipine 30mg PO by primary team. On our evaluation, patient without any dyspnea, lower extremity edema, and with mild chronic headache. Noted to be anxious about her blood pressure.    - Would recommend continuing nifedipine 30mg PO QD, enalapril 10mg QD, and labetalol 400mg TID; if patient's BPs are noted to be >140/90, please increase labetalol to 600mg TID first (give additional 200mg PO stat) rather than giving IV hydralazine push.   - Patient's BP goals are <140/90.  - Will need f/u within 2 weeks of dc w/ Dr. Maria Elena Rehman for BP monitoring and f/u echocardiogram      Case d/w attending Dr. Sapp  Cardiology will continue to follow  Joan Palomino DO Cardiology Fellow

## 2023-07-24 PROCEDURE — 99232 SBSQ HOSP IP/OBS MODERATE 35: CPT

## 2023-07-24 RX ORDER — LABETALOL HCL 100 MG
500 TABLET ORAL THREE TIMES A DAY
Refills: 0 | Status: DISCONTINUED | OUTPATIENT
Start: 2023-07-24 | End: 2023-07-25

## 2023-07-24 RX ORDER — NIFEDIPINE 30 MG
30 TABLET, EXTENDED RELEASE 24 HR ORAL EVERY 24 HOURS
Refills: 0 | Status: DISCONTINUED | OUTPATIENT
Start: 2023-07-24 | End: 2023-07-25

## 2023-07-24 RX ADMIN — Medication 30 MILLIGRAM(S): at 09:30

## 2023-07-24 RX ADMIN — Medication 10 MILLIGRAM(S): at 17:39

## 2023-07-24 RX ADMIN — Medication 500 MILLIGRAM(S): at 14:15

## 2023-07-24 RX ADMIN — Medication 975 MILLIGRAM(S): at 12:27

## 2023-07-24 RX ADMIN — SIMETHICONE 80 MILLIGRAM(S): 80 TABLET, CHEWABLE ORAL at 09:30

## 2023-07-24 RX ADMIN — Medication 975 MILLIGRAM(S): at 21:21

## 2023-07-24 RX ADMIN — Medication 975 MILLIGRAM(S): at 06:35

## 2023-07-24 RX ADMIN — Medication 500 MILLIGRAM(S): at 21:25

## 2023-07-24 RX ADMIN — Medication 400 MILLIGRAM(S): at 06:05

## 2023-07-24 RX ADMIN — Medication 975 MILLIGRAM(S): at 06:05

## 2023-07-24 RX ADMIN — ENOXAPARIN SODIUM 40 MILLIGRAM(S): 100 INJECTION SUBCUTANEOUS at 09:30

## 2023-07-24 RX ADMIN — Medication 975 MILLIGRAM(S): at 22:20

## 2023-07-24 NOTE — PROGRESS NOTE ADULT - SUBJECTIVE AND OBJECTIVE BOX
Cardiology Consult    O/N: RON    Interval History/HPI: Pt seen and examined at bedside, NAD, denies chest pain/discomfort/pressure. ROS unremarkable         OBJECTIVE  T(C): 36.8 (07-24-23 @ 06:00), Max: 36.9 (07-23-23 @ 18:00)  HR: 74 (07-24-23 @ 06:00) (69 - 90)  BP: 142/92 (07-24-23 @ 06:00) (103/67 - 155/99)  RR: 16 (07-24-23 @ 06:00) (16 - 18)  SpO2: 98% (07-24-23 @ 06:00) (98% - 98%)      PHYSICAL EXAM:    Constitutional: resting comfortably in bed; NAD  HEENT: NC/AT, EOMI, anicteric sclera, MMM  Neck: supple  Respiratory: CTA B/L; no W/R/R, no retractions  Cardiac: +S1/S2; RRR; no M/R/G  Gastrointestinal: soft, NT/ND; no rebound or guarding; +BSx4  Extremities: WWP, no clubbing or cyanosis; no peripheral edema  Musculoskeletal: NROM x4 grossly, no joint swelling, tenderness or erythema  Vascular: 2+ radial, DP/PT pulses B/L  Dermatologic: skin warm, dry and intact; rash on lower abdomen  Neurologic: AAOx3; CNII-XII grossly intact; no focal deficits            MEDICATIONS  (STANDING):  acetaminophen     Tablet .. 975 milliGRAM(s) Oral <User Schedule>  enalapril 10 milliGRAM(s) Oral every 24 hours  enoxaparin Injectable 40 milliGRAM(s) SubCutaneous every 24 hours  ibuprofen  Tablet. 600 milliGRAM(s) Oral every 6 hours  labetalol 500 milliGRAM(s) Oral three times a day  NIFEdipine XL 30 milliGRAM(s) Oral every 24 hours  oxytocin Infusion 333.333 milliUNIT(s)/Min (1000 mL/Hr) IV Continuous <Continuous>  zinc oxide 20% Ointment 1 Application(s) Topical daily    MEDICATIONS  (PRN):  diphenhydrAMINE 25 milliGRAM(s) Oral every 6 hours PRN Pruritus  lanolin Ointment 1 Application(s) Topical every 6 hours PRN Sore Nipples  LORazepam     Tablet 0.5 milliGRAM(s) Oral every 12 hours PRN Anxiety  magnesium hydroxide Suspension 30 milliLiter(s) Oral two times a day PRN Constipation  simethicone 80 milliGRAM(s) Chew every 4 hours PRN Gas     Cardiology Consult    O/N: RON    Interval History/HPI: Pt seen and examined at bedside, NAD, denies chest pain/discomfort/pressure. ROS unremarkable.        OBJECTIVE  T(C): 36.8 (07-24-23 @ 06:00), Max: 36.9 (07-23-23 @ 18:00)  HR: 74 (07-24-23 @ 06:00) (69 - 90)  BP: 142/92 (07-24-23 @ 06:00) (103/67 - 155/99)  RR: 16 (07-24-23 @ 06:00) (16 - 18)  SpO2: 98% (07-24-23 @ 06:00) (98% - 98%)      PHYSICAL EXAM:    Constitutional: resting comfortably in bed; NAD  HEENT: NC/AT, EOMI, anicteric sclera, MMM  Neck: supple  Respiratory: CTA B/L; no W/R/R, no retractions  Cardiac: +S1/S2; RRR; no M/R/G  Gastrointestinal: soft, NT/ND; no rebound or guarding; +BSx4  Extremities: WWP, no clubbing or cyanosis; no peripheral edema  Musculoskeletal: NROM x4 grossly, no joint swelling, tenderness or erythema  Vascular: 2+ radial, DP/PT pulses B/L  Dermatologic: skin warm, dry and intact; rash on lower abdomen  Neurologic: AAOx3; CNII-XII grossly intact; no focal deficits        MEDICATIONS  (STANDING):  acetaminophen     Tablet .. 975 milliGRAM(s) Oral <User Schedule>  enalapril 10 milliGRAM(s) Oral every 24 hours  enoxaparin Injectable 40 milliGRAM(s) SubCutaneous every 24 hours  ibuprofen  Tablet. 600 milliGRAM(s) Oral every 6 hours  labetalol 500 milliGRAM(s) Oral three times a day  NIFEdipine XL 30 milliGRAM(s) Oral every 24 hours  oxytocin Infusion 333.333 milliUNIT(s)/Min (1000 mL/Hr) IV Continuous <Continuous>  zinc oxide 20% Ointment 1 Application(s) Topical daily    MEDICATIONS  (PRN):  diphenhydrAMINE 25 milliGRAM(s) Oral every 6 hours PRN Pruritus  lanolin Ointment 1 Application(s) Topical every 6 hours PRN Sore Nipples  LORazepam     Tablet 0.5 milliGRAM(s) Oral every 12 hours PRN Anxiety  magnesium hydroxide Suspension 30 milliLiter(s) Oral two times a day PRN Constipation  simethicone 80 milliGRAM(s) Chew every 4 hours PRN Gas

## 2023-07-24 NOTE — DIETITIAN INITIAL EVALUATION ADULT - ADD RECOMMEND
1. Continue with regular diet  2. Encourage pt to meet nutritional needs as able   3. Monitor labs: electrolytes, cmp  4. Monitor weights   5. Pain and bowel regimen per team   6. Will continue to assess/honor food preferences as able  7. Monitor adherence to diet education

## 2023-07-24 NOTE — PROGRESS NOTE ADULT - ASSESSMENT
32y Female POD#10 s/p C/S, c/b cHTN and chorioamnionitis   - siPEC with SF (BPs) - s/p IV Mag  - Continue BP medication today Labetalol 400 TID, Enalapril 10 QD. Nifedipine 30mg qd started 7/23 after severe range BP requiring IV hydralazine.   - Cardiology consulted, will f/u updated recs today.   - Chorioamnionitis: afebrile, s/p IV A/G/C.            - Anxiety - Ativan 0.5mg QD             - Neuro/Pain: motrin atc, tylenol atc, oxy prn  - CV:  VSq4h  - Pulm: Encourage ISS & Ambulation  - GI: Advance as tolerated  - : Voiding spontaneously  - DVT ppx: SCDs, Lovenox 40mg QD  - Dispo: when BP well controlled

## 2023-07-24 NOTE — PROGRESS NOTE ADULT - ASSESSMENT
33 y/o F w PMH PCOS admitted for pregnancy delivery, pregnancy complicated by chorioamnionitis treated w/ ampicillin, clindamycin, and gentamicin, now s/p . Cardiology consulted for treatment of hypertension, no with pre-eclampsia with severe features.      Recommendations:    #Pre-eclampsia with severe features   - Patient now on triple therapy for HTN: nifedipine 30mg daily, enalapril 10mg daily, and labetalol 500mg TID      - If next BP reading is >140/90, increase labetalol to 600mg TID      - If BP continues to be resistant after this increase, consider increasing nifedipine to 60mg daily  - Avoid IV hydralazine due to patient's report of anxiety and possibility of rebound elevated BPs  - Patient's BP goals are <140/90.  - Will need f/u within 2 weeks of dc w/ Dr. Maria Elena Rehman for BP monitoring and f/u echocardiogram      #Elevated pulmonary artery pressure -  echo demonstrated PA pressure of 32 mmHg; this slight elevation in PA pressure may be a reflection of pregnancy or post-partum cardiac changes as a result of increased fluid volume  - f/u with repeat echocardiogram outpatient      #HLD  -Hypertriglyceridemia on lipid panel  -Discussed lifestyle modifications including daily 30 min moderate exercise and healthy food habits        Case reviewed w/ attending cardiologist  Plan discussed w/ primary team  Cardiology will continue to follow  Thank you for the consultation      Tayler Paulino, PGY-1  Internal Medicine Resident  Consult Pager: [phone]       33 y/o F w PMH PCOS admitted for pregnancy delivery, pregnancy complicated by chorioamnionitis treated w/ ampicillin, clindamycin, and gentamicin, now s/p . Cardiology consulted for treatment of hypertension, now with pre-eclampsia with severe features.      Recommendations:    #Superimposed pre-eclampsia with severe features   - Patient now on triple therapy for HTN: nifedipine 30mg daily, enalapril 10mg daily, and labetalol 500mg TID      - If next BP reading is >140/90, increase labetalol to 600mg TID      - If BP continues to be resistant after this increase, consider increasing nifedipine to 60mg daily  - Avoid IV hydralazine due to patient's report of anxiety and possibility of rebound elevated BPs  - Patient's BP goals are <140/90.  - Will need f/u within 2 weeks of dc w/ Dr. Maria Elena Rehman for BP monitoring and f/u echocardiogram      #Elevated pulmonary artery pressure -  echo demonstrated PA pressure of 32 mmHg; this slight elevation in PA pressure may be a reflection of pregnancy or post-partum cardiac changes as a result of increased fluid volume  - f/u with repeat echocardiogram outpatient  - patient completed 3 days of diuresis with low-dose lasix      #HLD  -Hypertriglyceridemia on lipid panel  -Discussed lifestyle modifications including daily 30 min moderate exercise and healthy food habits        Case reviewed w/ attending cardiologist  Plan discussed w/ primary team  Cardiology will continue to follow  Thank you for the consultation      Tayler Paulino, PGY-1  Internal Medicine Resident  Consult Pager: [phone]

## 2023-07-24 NOTE — DIETITIAN INITIAL EVALUATION ADULT - OTHER CALCULATIONS
Pre-pregnancy weight used to estimate needs (192#). Needs adjusted for breastfeeding, post-op healing.

## 2023-07-24 NOTE — PROGRESS NOTE ADULT - SUBJECTIVE AND OBJECTIVE BOX
Patient evaluated at bedside.   She reports pain is well controlled with OPM.  She has been ambulating without assistance, voiding spontaneously, passing gas, tolerating regular diet.  She denies HA, dizziness, CP, palpitations, SOB, n/v, or heavy vaginal bleeding.    Physical Exam:  Vital Signs Last 24 Hrs  T(C): 36.8 (24 Jul 2023 06:00), Max: 36.9 (23 Jul 2023 18:00)  T(F): 98.2 (24 Jul 2023 06:00), Max: 98.4 (23 Jul 2023 18:00)  HR: 74 (24 Jul 2023 06:00) (69 - 90)  BP: 142/92 (24 Jul 2023 06:00) (103/67 - 165/114)  BP(mean): 100 (23 Jul 2023 13:42) (79 - 131)  RR: 16 (24 Jul 2023 06:00) (16 - 19)  SpO2: 98% (24 Jul 2023 06:00) (98% - 98%)    Parameters below as of 23 Jul 2023 18:00  Patient On (Oxygen Delivery Method): room air        Gen: NAD  Abd: + BS, soft, nontender, nondistended, no rebound or guarding  Incision clean, dry and intact  uterus firm at midline  : lochia WNL  Extremities: no swelling or calf tenderness

## 2023-07-24 NOTE — DIETITIAN INITIAL EVALUATION ADULT - OTHER INFO
Patient seen at bedside for length of stay assessment. Patient is s/p  on  at 38.5 weeks. Being monitored for elevated BP. Current diet order: regular. NKFA. Reports appetite is "waxing and waning" since giving birth but is aware she needs to eat consistently since she is breastfeeding. Pt consumed spinach and cheese omelet with berries for breakfast. Reported no major changes in appetite during pregnancy and no . Nutrition education. Pre-pregnancy weight: Dosing weight: No edema documented. Denies N/V/D/C. Labs. Meds: blank.  Patient seen at bedside for length of stay assessment. Patient is s/p  on  at 38.5 weeks. Being monitored for elevated BP. Current diet order: regular. NKFA. Reports appetite is "waxing and waning" since giving birth but is aware she needs to eat consistently since she is breastfeeding. Pt consumed spinach and cheese omelet with berries for breakfast. Reported no major changes in appetite during pregnancy and no morning sickness. Provided nutrition education discussing importance of adequate protein, repleting iron, limiting salt due to elevated blood pressure. Patient asked about coffee, however not indicated at this time due to elevated BP. Provided handout "breastfeeding nutrition therapy." Patient appreciative and verbalized understanding. Pre-pregnancy weight: 192#. Dosing weight: 225#. No edema documented. Denies N/V/D/C. Magnesium high. RD to remain available.

## 2023-07-24 NOTE — DIETITIAN INITIAL EVALUATION ADULT - PERTINENT MEDS FT
MEDICATIONS  (STANDING):  acetaminophen     Tablet .. 975 milliGRAM(s) Oral <User Schedule>  enalapril 10 milliGRAM(s) Oral every 24 hours  enoxaparin Injectable 40 milliGRAM(s) SubCutaneous every 24 hours  ibuprofen  Tablet. 600 milliGRAM(s) Oral every 6 hours  labetalol 500 milliGRAM(s) Oral three times a day  NIFEdipine XL 30 milliGRAM(s) Oral every 24 hours  oxytocin Infusion 333.333 milliUNIT(s)/Min (1000 mL/Hr) IV Continuous <Continuous>  zinc oxide 20% Ointment 1 Application(s) Topical daily    MEDICATIONS  (PRN):  diphenhydrAMINE 25 milliGRAM(s) Oral every 6 hours PRN Pruritus  lanolin Ointment 1 Application(s) Topical every 6 hours PRN Sore Nipples  LORazepam     Tablet 0.5 milliGRAM(s) Oral every 12 hours PRN Anxiety  magnesium hydroxide Suspension 30 milliLiter(s) Oral two times a day PRN Constipation  simethicone 80 milliGRAM(s) Chew every 4 hours PRN Gas

## 2023-07-25 VITALS
TEMPERATURE: 98 F | RESPIRATION RATE: 18 BRPM | SYSTOLIC BLOOD PRESSURE: 129 MMHG | HEART RATE: 75 BPM | OXYGEN SATURATION: 98 % | DIASTOLIC BLOOD PRESSURE: 84 MMHG

## 2023-07-25 PROCEDURE — 85610 PROTHROMBIN TIME: CPT

## 2023-07-25 PROCEDURE — 93005 ELECTROCARDIOGRAM TRACING: CPT

## 2023-07-25 PROCEDURE — 36415 COLL VENOUS BLD VENIPUNCTURE: CPT

## 2023-07-25 PROCEDURE — 86850 RBC ANTIBODY SCREEN: CPT

## 2023-07-25 PROCEDURE — 84550 ASSAY OF BLOOD/URIC ACID: CPT

## 2023-07-25 PROCEDURE — 85384 FIBRINOGEN ACTIVITY: CPT

## 2023-07-25 PROCEDURE — 85025 COMPLETE CBC W/AUTO DIFF WBC: CPT

## 2023-07-25 PROCEDURE — 83735 ASSAY OF MAGNESIUM: CPT

## 2023-07-25 PROCEDURE — 85027 COMPLETE CBC AUTOMATED: CPT

## 2023-07-25 PROCEDURE — 86780 TREPONEMA PALLIDUM: CPT

## 2023-07-25 PROCEDURE — 99232 SBSQ HOSP IP/OBS MODERATE 35: CPT

## 2023-07-25 PROCEDURE — 80061 LIPID PANEL: CPT

## 2023-07-25 PROCEDURE — 90715 TDAP VACCINE 7 YRS/> IM: CPT

## 2023-07-25 PROCEDURE — 86901 BLOOD TYPING SEROLOGIC RH(D): CPT

## 2023-07-25 PROCEDURE — 83615 LACTATE (LD) (LDH) ENZYME: CPT

## 2023-07-25 PROCEDURE — 84443 ASSAY THYROID STIM HORMONE: CPT

## 2023-07-25 PROCEDURE — 80053 COMPREHEN METABOLIC PANEL: CPT

## 2023-07-25 PROCEDURE — 59050 FETAL MONITOR W/REPORT: CPT

## 2023-07-25 PROCEDURE — 86900 BLOOD TYPING SEROLOGIC ABO: CPT

## 2023-07-25 PROCEDURE — 83036 HEMOGLOBIN GLYCOSYLATED A1C: CPT

## 2023-07-25 PROCEDURE — 84156 ASSAY OF PROTEIN URINE: CPT

## 2023-07-25 PROCEDURE — 99214 OFFICE O/P EST MOD 30 MIN: CPT

## 2023-07-25 PROCEDURE — 82570 ASSAY OF URINE CREATININE: CPT

## 2023-07-25 PROCEDURE — 85730 THROMBOPLASTIN TIME PARTIAL: CPT

## 2023-07-25 PROCEDURE — 88307 TISSUE EXAM BY PATHOLOGIST: CPT

## 2023-07-25 PROCEDURE — 93307 TTE W/O DOPPLER COMPLETE: CPT

## 2023-07-25 RX ORDER — LABETALOL HCL 100 MG
1 TABLET ORAL
Qty: 90 | Refills: 1
Start: 2023-07-25 | End: 2023-09-22

## 2023-07-25 RX ORDER — IBUPROFEN 200 MG
1 TABLET ORAL
Qty: 0 | Refills: 0 | DISCHARGE
Start: 2023-07-25

## 2023-07-25 RX ORDER — ACETAMINOPHEN 500 MG
3 TABLET ORAL
Qty: 0 | Refills: 0 | DISCHARGE
Start: 2023-07-25

## 2023-07-25 RX ORDER — NIFEDIPINE 30 MG
1 TABLET, EXTENDED RELEASE 24 HR ORAL
Qty: 30 | Refills: 1
Start: 2023-07-25 | End: 2023-09-22

## 2023-07-25 RX ORDER — LABETALOL HCL 100 MG
5 TABLET ORAL
Qty: 450 | Refills: 1
Start: 2023-07-25 | End: 2023-09-22

## 2023-07-25 RX ADMIN — Medication 975 MILLIGRAM(S): at 06:40

## 2023-07-25 RX ADMIN — Medication 975 MILLIGRAM(S): at 07:04

## 2023-07-25 RX ADMIN — ENOXAPARIN SODIUM 40 MILLIGRAM(S): 100 INJECTION SUBCUTANEOUS at 08:28

## 2023-07-25 RX ADMIN — Medication 500 MILLIGRAM(S): at 06:40

## 2023-07-25 RX ADMIN — Medication 500 MILLIGRAM(S): at 13:41

## 2023-07-25 RX ADMIN — Medication 975 MILLIGRAM(S): at 11:52

## 2023-07-25 RX ADMIN — Medication 30 MILLIGRAM(S): at 08:28

## 2023-07-25 NOTE — PROGRESS NOTE ADULT - SUBJECTIVE AND OBJECTIVE BOX
Cardiology Consult    O/N:  Interval History/HPI: Pt seen and examined at bedside, NAD, denies chest pain/discomfort/pressure. ROS unremarkable         OBJECTIVE  T(C): 36.9 (07-25-23 @ 06:00), Max: 36.9 (07-25-23 @ 06:00)  HR: 73 (07-25-23 @ 06:00) (68 - 85)  BP: 124/77 (07-25-23 @ 06:00) (123/77 - 139/99)  RR: 18 (07-25-23 @ 06:00) (17 - 18)  SpO2: 98% (07-25-23 @ 06:00) (98% - 99%)      PHYSICAL EXAM:    Constitutional: resting comfortably in bed; NAD  HEENT: NC/AT, EOMI, anicteric sclera, MMM  Neck: supple  Respiratory: CTA B/L; no W/R/R, no retractions  Cardiac: +S1/S2; RRR; no M/R/G  Gastrointestinal: soft, NT/ND; no rebound or guarding; +BSx4  Extremities: WWP, no clubbing or cyanosis; no peripheral edema  Musculoskeletal: NROM x4 grossly, no joint swelling, tenderness or erythema  Vascular: 2+ radial, DP/PT pulses B/L  Dermatologic: skin warm, dry and intact; rash on lower abdomen  Neurologic: AAOx3; CNII-XII grossly intact; no focal deficits          RADIOLOGY & ADDITIONAL TESTS:  Reviewed .    MEDICATIONS  (STANDING):  acetaminophen     Tablet .. 975 milliGRAM(s) Oral <User Schedule>  enalapril 10 milliGRAM(s) Oral every 24 hours  enoxaparin Injectable 40 milliGRAM(s) SubCutaneous every 24 hours  ibuprofen  Tablet. 600 milliGRAM(s) Oral every 6 hours  labetalol 500 milliGRAM(s) Oral three times a day  NIFEdipine XL 30 milliGRAM(s) Oral every 24 hours  oxytocin Infusion 333.333 milliUNIT(s)/Min (1000 mL/Hr) IV Continuous <Continuous>  zinc oxide 20% Ointment 1 Application(s) Topical daily    MEDICATIONS  (PRN):  diphenhydrAMINE 25 milliGRAM(s) Oral every 6 hours PRN Pruritus  lanolin Ointment 1 Application(s) Topical every 6 hours PRN Sore Nipples  LORazepam     Tablet 0.5 milliGRAM(s) Oral every 12 hours PRN Anxiety  magnesium hydroxide Suspension 30 milliLiter(s) Oral two times a day PRN Constipation  simethicone 80 milliGRAM(s) Chew every 4 hours PRN Gas     Cardiology Consult    O/N: RON    Interval History/HPI: Pt seen and examined at bedside, NAD, denies chest pain/discomfort/pressure. Denies headache. ROS unremarkable otherwise.        OBJECTIVE  T(C): 36.9 (07-25-23 @ 06:00), Max: 36.9 (07-25-23 @ 06:00)  HR: 73 (07-25-23 @ 06:00) (68 - 85)  BP: 124/77 (07-25-23 @ 06:00) (123/77 - 139/99)  RR: 18 (07-25-23 @ 06:00) (17 - 18)  SpO2: 98% (07-25-23 @ 06:00) (98% - 99%)      PHYSICAL EXAM:    Constitutional: resting comfortably in bed; NAD  HEENT: NC/AT, EOMI, anicteric sclera, MMM  Neck: supple  Respiratory: CTA B/L; no W/R/R, no retractions  Cardiac: +S1/S2; RRR; no M/R/G  Gastrointestinal: soft, NT/ND; no rebound or guarding; +BS  Extremities: WWP, no clubbing or cyanosis; no peripheral edema  Musculoskeletal: NROM x4 grossly, no joint swelling, tenderness or erythema  Vascular: 2+ radial, DP/PT pulses B/L  Dermatologic: skin warm, dry and intact; rash on lower abdomen, improved  Neurologic: AAOx3; CNII-XII grossly intact; no focal deficits          RADIOLOGY & ADDITIONAL TESTS:  Reviewed .    MEDICATIONS  (STANDING):  acetaminophen     Tablet .. 975 milliGRAM(s) Oral <User Schedule>  enalapril 10 milliGRAM(s) Oral every 24 hours  enoxaparin Injectable 40 milliGRAM(s) SubCutaneous every 24 hours  ibuprofen  Tablet. 600 milliGRAM(s) Oral every 6 hours  labetalol 500 milliGRAM(s) Oral three times a day  NIFEdipine XL 30 milliGRAM(s) Oral every 24 hours  oxytocin Infusion 333.333 milliUNIT(s)/Min (1000 mL/Hr) IV Continuous <Continuous>  zinc oxide 20% Ointment 1 Application(s) Topical daily    MEDICATIONS  (PRN):  diphenhydrAMINE 25 milliGRAM(s) Oral every 6 hours PRN Pruritus  lanolin Ointment 1 Application(s) Topical every 6 hours PRN Sore Nipples  LORazepam     Tablet 0.5 milliGRAM(s) Oral every 12 hours PRN Anxiety  magnesium hydroxide Suspension 30 milliLiter(s) Oral two times a day PRN Constipation  simethicone 80 milliGRAM(s) Chew every 4 hours PRN Gas

## 2023-07-25 NOTE — PROGRESS NOTE ADULT - SUBJECTIVE AND OBJECTIVE BOX
Patient evaluated at bedside.   She reports pain is well controlled with OPM.  She has been ambulating without assistance, voiding spontaneously, passing gas, tolerating regular diet.  She denies HA, dizziness, CP, palpitations, SOB, RUQ/epigastric pain, n/v, or heavy vaginal bleeding.    Physical Exam:  Vital Signs Last 24 Hrs  T(C): 36.7 (25 Jul 2023 13:48), Max: 36.9 (25 Jul 2023 06:00)  T(F): 98.1 (25 Jul 2023 13:48), Max: 98.4 (25 Jul 2023 06:00)  HR: 75 (25 Jul 2023 13:48) (64 - 80)  BP: 129/84 (25 Jul 2023 13:48) (117/80 - 133/87)  BP(mean): --  RR: 18 (25 Jul 2023 13:48) (17 - 18)  SpO2: 98% (25 Jul 2023 13:48) (98% - 98%)    Parameters below as of 25 Jul 2023 13:48  Patient On (Oxygen Delivery Method): room air        Gen: NAD  Abd: + BS, soft, nontender, nondistended, no rebound or guarding  Incision clean, dry and intact  uterus firm at midline  No RUQ/epigastric tenderness  : lochia WNL  Extremities: no swelling or calf tenderness

## 2023-07-25 NOTE — PROGRESS NOTE ADULT - ASSESSMENT
33 y/o F w PMH PCOS admitted for pregnancy delivery, pregnancy complicated by chorioamnionitis treated w/ ampicillin, clindamycin, and gentamicin, now s/p . Cardiology consulted for treatment of hypertension, now with pre-eclampsia with severe features.      Recommendations:    #Superimposed pre-eclampsia with severe features   - Patient now on triple therapy for HTN: nifedipine 30mg daily, enalapril 10mg daily, and labetalol 500mg TID      - Patient's BPs have been <140/90 since increase in labetalol to 500mg TID          - If BP continues to be elevated, consider increase labetalol 600mg TID          - If BP continues to be elevated, consider increasing nifedipine to 60mg daily  - Avoid IV hydralazine due to patient's report of anxiety and possibility of rebound elevated BPs  - Patient's BP goals are <140/90.  - Will need f/u within 2 weeks of dc w/ Dr. Maria Elena Rehman for BP monitoring and f/u echocardiogram      #Elevated pulmonary artery pressure -  echo demonstrated PA pressure of 32 mmHg; this slight elevation in PA pressure may be a reflection of pregnancy or post-partum cardiac changes as a result of increased fluid volume  - f/u with repeat echocardiogram outpatient  - patient completed 3 days of diuresis with low-dose lasix      #HLD  -Hypertriglyceridemia on lipid panel  -Discussed lifestyle modifications including daily 30 min moderate exercise and healthy food habits        Case will be reviewed w/ attending cardiologist  Plan will be discussed w/ primary team  Cardiology will continue to follow  Thank you for the consultation      Tayler Paulino, PGY-1  Internal Medicine Resident  Consult Pager: [phone]

## 2023-07-25 NOTE — PROGRESS NOTE ADULT - ATTENDING COMMENTS
Patient is a 31 y/o Female w PMH PCOS, with intermittent HTN episodes noted around 16 weeks of pregnancy at OB's office, with subsequent Home BP monitoring within normal range until the third trimester ( 130/80'S),  admitted for pregnancy delivery via C section with course complicated by chorioamnionitis and HTN for which Cardiology is following    Review of studies:  - EKG 07/19: normal sinus rhythm, no evidence of ischemia    # HTN/Superimposed Preclampsia with severe features  # PCOS  # Moderate TR with Mildly dilateted RA      1) Superimposed Preeclampsia with severe features  - Since delivery BP's had  ranged from 130's-160's/90's-100's. Patient had remained asymptomatic without chest pain, Dyspnea, pulmonary edema until 7/20 when BP's were registered at 169/103 and 168/105 with patient reporting a Headache Prompting transfer to OB tele and initiation of Magnesium  - BP's had remained at goal but over the weekend patient required pushes of IV HYDRAL for SBP of 160/100's, prompting initiation of Nifedipine 30 mg Po daily  - Cont with Enalapril to 10 mg Po Daily and Nifedipine 30 mg XL Po daily. Increase Labetalol to 500 mg PO TID with goal to keep BP <140/90  - If BP persistently >140/90, would op to uptitrate labetalol to 600 TID (with strict holding parameters) over IV Hydralazine pushes, which are short acting and can cause rebound hypertension.  - If Labetalol at 600 TID and BP not at goal, THAN would increase Nifedipine to 60 mg XL daily.   - Plan will resume outpatient BP monitoring with plan to call Cardiology and OB should readings remain persistently elevated. Counseled her on symptoms of Hypo/Hypertension  - Lipid profile with Hypertriglyceridemia. Will recommend lifestyle modifications and close outpatient monitoring  - PCOS places women at higher overall cardiovascular risk. Will need outpatient follow up with Dr Maria Elena Rehman within 2 weeks of DC    2) Moderate TR with Dilated RA with Normal RV size and function  - Dilated RA could be related to post pregnancy volume changes in patient mildly overloaded  - Diuresesed with low dose lasix for 3 days  - Repeat Echo in 4-6 weeks .      Cardiology will continue to follow, please call with any questions
Patient is a 33 y/o Female w PMH PCOS, with intermittent HTN episodes noted around 16 weeks of pregnancy at OB's office, with subsequent Home BP monitoring within normal range until the third trimester ( 130/80'S),  admitted for pregnancy delivery via C section with course complicated by chorioamnionitis and HTN for which Cardiology is following    Review of studies:  - EKG 07/19: normal sinus rhythm, no evidence of ischemia    # HTN/Superimpose Preclampsia with severe features  # PCOS  # Moderate TR with Mildly dilateted RA      1) Superimposed Preclampsia with severe features  - Since delivery BP's had  ranged from 130's-160's/90's-100's. Patient had remained asymptomatic without chest pain, Dyspnea, pulmonary edema until last night where two consecutives BP's were registered at 169/103 and 168/105 with patient reporting a Headache Prompting transfer to OB tele and initiation of Magnesium  - BP's have since remained in the 120's with one read of 132/88 on her current regimen  - Cont with Enalapril to 10 mg Po Daily and  Labetalol 400 TID with goal to keep BP <140/90  - Given Moderate TR on Echo cont Lasix 20 mg PO for 1 more day, last dose to be given on 7/22  - Plan will resume outpatient BP monitoring with plan to call Cardiology and OB should readings remain persistently elevated. Counseled her on symptoms of Hypo/Hypertension  - Lipid profile with Hypertriglyceridemia. Will recommend lifestyle modifications and close outpatient monitoring  - PCOS places women at higher overall cardiovascular risk. Will need outpatient follow up with Dr Maria Elena Rehman within 2 weeks of DC    2) Moderate TR with Dilated RA with Normal RV size and function  - Dilated RA could be related to post pregnancy volume changes in patient mildly overloaded  - Cont lasix 20 mg PO until tomorrow  - Repeat Echo in 4-6 weeks
Blood pressures at goal with addition of nifedepine this morning. Continue current regimen
Patient is a 31 y/o Female w [facility] PCOS, with intermittent HTN episodes noted around 16 weeks of pregnancy at OB's office, with subsequent Home BP monitoring within normal range until the third trimester ( 130/80'S),  admitted for pregnancy delivery via C section with course complicated by chorioamnionitis and HTN for which Cardiology is following    Review of studies:  - EKG 07/19: normal sinus rhythm, no evidence of ischemia    # HTN/Superimposed Preeclampsia with severe features  # PCOS  # Moderate TR with Mildly dilated RA      1) Superimposed Preeclampsia with severe features  - Since delivery BP's had  ranged from 130's-160's/90's-100's. Patient had remained asymptomatic without chest pain, Dyspnea, pulmonary edema until 7/20 when BP's were registered at 169/103 and 168/105 with patient reporting a Headache Prompting transfer to OB tele and initiation of Magnesium  - BP's at goal ranging in the 120's-130's in the last 24 hours  - Cont with Enalapril to 10 mg Po Daily, Nifedipine 30 mg XL Po daily and Labetalol 500 mg PO TID with goal to keep BP <140/90  - If BP persistently >140/90, would op to uptitrate labetalol to 600 TID (with strict holding parameters)  - If Labetalol at 600 TID and BP not at goal, THAN would increase Nifedipine to 60 mg XL daily.   - Plan will resume outpatient BP monitoring with plan to call Cardiology and OB should readings remain persistently elevated. Counseled her on symptoms of Hypo/Hypertension  - Lipid profile with Hypertriglyceridemia. Will recommend lifestyle modifications and close outpatient monitoring  - PCOS places women at higher overall cardiovascular risk. Will need outpatient follow up with Dr Maria Elena Rehman within 2 weeks of DC    2) Moderate TR with Dilated RA with Normal RV size and function  - Dilated RA could be related to post pregnancy volume changes in patient mildly overloaded  - Diuresed with low dose lasix for 3 days  - Repeat Echo in 4-6 weeks .    Please ensure outpatient with Dr Maria Elena Rehman established within 2 weeks of DC for BP monitoring
Patient is a 31 y/o Female w PMH PCOS, with intermittent HTN episodes noted around 16 weeks of pregnancy at OB's office, with subsequent Home BP monitoring within normal range until the third trimester ( 130/80'S),  admitted for pregnancy delivery via C section with course complicated by chorioamnionitis and HTN for which Cardiology is following    Review of studies:  - EKG 07/19: normal sinus rhythm, no evidence of ischemia    # HTN  # PCOS  # Moderate TR with Mildly dilateted RA      1) HTN  - Unclear if chronic HTN vs gestational HTN. Patient noted intermittent HTN episodes noted around 16 weeks of pregnancy at OB's office, that she attributed to white coat hypertension, with subsequent Home BP monitoring within normal range until the third trimester, where he BP have ranged 130/80'S.  - Since delivery BP's have ranged from 130's-160's/90's-100's. Patient has remained asymptomatic without chest pain, Dyspnea, pulmonary edema  - Labs unremarkable: No proteinuria, thrombocytopenia or transaminitis  - Previously started on Nifedipine 30 mg Po BID and Labetalol 200 mg BID; than on labetalol 300 TID (7/18) and enalapril 5 mg daily. Nifedipine has since been discontinued (7/18)  - Labetalol uptitrated to 400 mg TID and continued on Enalapril 5 mg po qd on 7/19 with BP range 113/80's-150/88. Mostly BP has remained in the 140's range  - At this time would increase her Enalapril to 10 mg Po Daily. Please give addition 5 mg PO STAT and continue on Labetalol 400 TID  - Goal to keep BP <140/90. ideally <130  - Given Moderate TR on Echo will recommend Lasix 20 mg PO for a total of 3 days with goal to repeat Echo in 4-6 weeks  - Plan will resume outpatient BP monitoring with plan to call Cardiology and OB should readings remain persistently elevated. Counseled her on symtpoms of Hypo/Hypertension  - Lipid profile with Hypertriglyceridemia. Will recommend lifestyle modifications and close outpatient monitoring  - PCOS places women at higher overall cardiovascular risk. Will need outpatient follow up with Dr Maria Elena Rehman within 2 weeks of DC    2) Moderate TR with Dilated RA with Normal RV size and function  - Dilated RA could be related to post pregnancy volume changes in patient mildly overloaded  - Given near euvolemia will dose lasix 20 mg PO x 3 days starting today.  - Repeat Echo in 4-6 weeks      Please call cardiology with any questions

## 2023-07-25 NOTE — PROGRESS NOTE ADULT - ASSESSMENT
32y Female POD#11 s/p C/S, c/b cHTN and chorioamnionitis   - siPEC with SF (BPs) - s/p IV Mag  - Continue BP medication today Labetalol 500 TID, Enalapril 10 QD. Nifedipine 30mg qd started 7/23 after severe range BP requiring IV hydralazine. Normotensive overnight.   - Cardiology consulted, will f/u updated recs today.   - Chorioamnionitis: afebrile, s/p IV A/G/C.            - Anxiety - Ativan 0.5mg QD             - Neuro/Pain: motrin atc, tylenol atc, oxy prn  - CV:  VSq4h  - Pulm: Encourage ISS & Ambulation  - GI: Advance as tolerated  - : Voiding spontaneously  - DVT ppx: SCDs, Lovenox 40mg QD  - Dispo: when BP well controlled

## 2023-08-01 ENCOUNTER — APPOINTMENT (OUTPATIENT)
Dept: HEART AND VASCULAR | Facility: CLINIC | Age: 33
End: 2023-08-01
Payer: COMMERCIAL

## 2023-08-01 VITALS
SYSTOLIC BLOOD PRESSURE: 119 MMHG | HEIGHT: 65 IN | OXYGEN SATURATION: 97 % | HEART RATE: 72 BPM | BODY MASS INDEX: 34.99 KG/M2 | DIASTOLIC BLOOD PRESSURE: 77 MMHG | WEIGHT: 210 LBS | TEMPERATURE: 97.6 F

## 2023-08-01 DIAGNOSIS — Z78.9 OTHER SPECIFIED HEALTH STATUS: ICD-10-CM

## 2023-08-01 DIAGNOSIS — O41.1290 CHORIOAMNIONITIS, UNSPECIFIED TRIMESTER, NOT APPLICABLE OR UNSPECIFIED: ICD-10-CM

## 2023-08-01 PROCEDURE — 99205 OFFICE O/P NEW HI 60 MIN: CPT | Mod: 25

## 2023-08-01 PROCEDURE — 93000 ELECTROCARDIOGRAM COMPLETE: CPT

## 2023-08-01 NOTE — DISCUSSION/SUMMARY
[Patient] : the patient [EKG obtained to assist in diagnosis and management of assessed problem(s)] : EKG obtained to assist in diagnosis and management of assessed problem(s) [___ Week(s)] : in [unfilled] week(s) [FreeTextEntry1] :  33 y/o female with h/o htn, obesity, post partum 7/14/23 who presents for f/up today  -continue current regimen - labetalol, enalapril, nifedipine  -ekg ordered today - nsr, normal intervals, no st/t changes -bp close monitoring  -echo ordered for htn -w/up htn for secondary causes once 3 months post partum - Renal duplex, maria victoria eval, labs -counseled on cvd risk factors -f/up 3-4 weeks for htn  I have spent 60 minutes reviewing labs, records, tests and discussed htn, cvd risk factors

## 2023-08-01 NOTE — HISTORY OF PRESENT ILLNESS
[FreeTextEntry1] :  31 y/o female with h/o htn, obesity, post partum 7/14/23 who presents for f/up today   no cp, sob, syncope, lh, palpitations, edema, orthopnea, pnd   h/o htn - prior to pregnancy during pregnancy - bp monitored 120/80's until 3rd trimester at 38 weeks/5 days - bp elevated to 160/110 induced - was on hydral/mag kept in hospital 11 days  post partum started on bp meds    diagnosed w chorioamnionitis - got 24 hr iv abx    dc'd on labetalol 500 mg tid and nifedipine 30 mg qd and enalapril 10 mg qd  father diagnosed w htn 28  walks for exercise diet healthy monitoring salt  h/o snoring  breastfeeding   PMH/PSH: htn c section chorioamnionitis obesity   ALL: nkda  MEDS: labetalol 500 mg tid nifedipine 30 mg qd enalapril 10 mg qd     SH: no tobacco social etoh no drugs  works in [place] from [employer]    FH: mother - alive, dm, 67 father - htn, dm, alive, 67 brother - alive, 36, healthy

## 2023-08-03 DIAGNOSIS — I07.1 RHEUMATIC TRICUSPID INSUFFICIENCY: ICD-10-CM

## 2023-08-03 DIAGNOSIS — O41.1230 CHORIOAMNIONITIS, THIRD TRIMESTER, NOT APPLICABLE OR UNSPECIFIED: ICD-10-CM

## 2023-08-03 DIAGNOSIS — Z3A.38 38 WEEKS GESTATION OF PREGNANCY: ICD-10-CM

## 2023-08-03 DIAGNOSIS — E28.2 POLYCYSTIC OVARIAN SYNDROME: ICD-10-CM

## 2023-08-03 DIAGNOSIS — D62 ACUTE POSTHEMORRHAGIC ANEMIA: ICD-10-CM

## 2023-08-03 DIAGNOSIS — E78.5 HYPERLIPIDEMIA, UNSPECIFIED: ICD-10-CM

## 2023-08-11 ENCOUNTER — APPOINTMENT (OUTPATIENT)
Dept: HEART AND VASCULAR | Facility: CLINIC | Age: 33
End: 2023-08-11
Payer: COMMERCIAL

## 2023-08-11 DIAGNOSIS — I10 ESSENTIAL (PRIMARY) HYPERTENSION: ICD-10-CM

## 2023-08-11 DIAGNOSIS — R01.1 CARDIAC MURMUR, UNSPECIFIED: ICD-10-CM

## 2023-08-11 PROCEDURE — 93306 TTE W/DOPPLER COMPLETE: CPT

## 2023-08-15 ENCOUNTER — NON-APPOINTMENT (OUTPATIENT)
Age: 33
End: 2023-08-15

## 2023-08-15 PROBLEM — I10 BENIGN ESSENTIAL HYPERTENSION: Status: ACTIVE | Noted: 2023-08-01

## 2023-08-15 PROBLEM — R01.1 MURMUR: Status: ACTIVE | Noted: 2023-08-01

## 2023-08-29 ENCOUNTER — TRANSCRIPTION ENCOUNTER (OUTPATIENT)
Age: 33
End: 2023-08-29

## 2023-08-29 ENCOUNTER — APPOINTMENT (OUTPATIENT)
Dept: HEART AND VASCULAR | Facility: CLINIC | Age: 33
End: 2023-08-29
Payer: COMMERCIAL

## 2023-08-29 VITALS
HEART RATE: 71 BPM | DIASTOLIC BLOOD PRESSURE: 78 MMHG | TEMPERATURE: 98.4 F | SYSTOLIC BLOOD PRESSURE: 114 MMHG | WEIGHT: 200 LBS | OXYGEN SATURATION: 98 % | HEIGHT: 65 IN | BODY MASS INDEX: 33.32 KG/M2

## 2023-08-29 PROCEDURE — 99214 OFFICE O/P EST MOD 30 MIN: CPT | Mod: 25

## 2023-08-29 RX ORDER — NIFEDIPINE 30 MG/1
30 TABLET, EXTENDED RELEASE ORAL
Qty: 30 | Refills: 3 | Status: DISCONTINUED | COMMUNITY
Start: 1900-01-01 | End: 2023-08-29

## 2023-08-29 NOTE — HISTORY OF PRESENT ILLNESS
[FreeTextEntry1] : 31 y/o female with h/o htn, obesity, post partum 7/14/23 who presents for f/up today  last seen 8/23 has weaned down labetalol to 200 tid - on this since 8/10/23  Echo ordered  -Echo 8/23 1. Moderately dilated left ventricular cavity size. Left ventricular wall thickness is mildly increased. Left ventricular systolic function is normal with an ejection fraction of 59 % by Elder's method of disks. 2. There is increased LV mass and eccentric hypertrophy. 3. Normal right ventricular cavity size and normal right ventricular wall thickness. 4. Normal atria. 5. Structurally normal mitral valve with normal leaflet excursion. 6. The aortic valve is tricuspid with normal structure without stenosis with normal systolic excursion. 7. Mild tricuspid regurgitation. 8. Estimated pulmonary artery systolic pressure is 29 mmHg. 9. No pericardial effusion seen. 10. No echocardiographic evidence of pulmonary hypertension.  no cp, sob, syncope, lh, palpitations, edema, orthopnea, pnd  h/o htn - prior to pregnancy during pregnancy - bp monitored 120/80's until 3rd trimester at 38 weeks/5 days - bp elevated to 160/110 induced - was on hydral/mag kept in hospital 11 days post partum started on bp meds   diagnosed w chorioamnionitis - got 24 hr iv abx    dc'd on labetalol 500 mg tid and nifedipine 30 mg qd and enalapril 10 mg qd  father diagnosed w htn 28  walks for exercise diet healthy monitoring salt  h/o snoring  breastfeeding  PMH/PSH: htn c section chorioamnionitis obesity   ALL: nkda  MEDS: labetalol 200 mg tid nifedipine 30 mg qd enalapril 10 mg qd    SH: no tobacco social etoh no drugs  works in [place] from [employer]    FH: mother - alive, dm, 67 father - htn, dm, alive, 67

## 2023-08-29 NOTE — DISCUSSION/SUMMARY
[Patient] : the patient [___ Week(s)] : in [unfilled] week(s) [FreeTextEntry1] : 31 y/o female with h/o htn, obesity, post partum 7/14/23 who presents for f/up today  -anti- htn current regimen - wean labetalol 100 tid for next 2 days, change nifedipine to norvasc, continue  enalapril, nifedipine -ekg 8/23 - nsr, normal intervals, no st/t changes -bp close monitoring -w/up htn for secondary causes once 3 months post partum - Renal duplex, maria victoria eval, labs -will repeat echo 6 months for LV size - can consider CV MRI -Echo 8/23 1. Moderately dilated left ventricular cavity size. Left ventricular wall thickness is mildly increased. Left ventricular systolic function is normal with an ejection fraction of 59 % by Elder's method of disks. 2. There is increased LV mass and eccentric hypertrophy. 3. Normal right ventricular cavity size and normal right ventricular wall thickness. 4. Normal atria. 5. Structurally normal mitral valve with normal leaflet excursion. 6. The aortic valve is tricuspid with normal structure without stenosis with normal systolic excursion. 7. Mild tricuspid regurgitation. 8. Estimated pulmonary artery systolic pressure is 29 mmHg. 9. No pericardial effusion seen. 10. No echocardiographic evidence of pulmonary hypertension. -counseled on cvd risk factors -f/up 2 weeks for htn  I have spent 30 minutes reviewing labs, records, tests and discussed htn, cvd risk factors.

## 2023-09-03 ENCOUNTER — NON-APPOINTMENT (OUTPATIENT)
Age: 33
End: 2023-09-03

## 2023-09-04 ENCOUNTER — NON-APPOINTMENT (OUTPATIENT)
Age: 33
End: 2023-09-04

## 2023-09-04 ENCOUNTER — EMERGENCY (EMERGENCY)
Facility: HOSPITAL | Age: 33
LOS: 1 days | Discharge: HOME | End: 2023-09-04
Attending: STUDENT IN AN ORGANIZED HEALTH CARE EDUCATION/TRAINING PROGRAM | Admitting: STUDENT IN AN ORGANIZED HEALTH CARE EDUCATION/TRAINING PROGRAM
Payer: COMMERCIAL

## 2023-09-04 VITALS
TEMPERATURE: 98 F | WEIGHT: 182.98 LBS | RESPIRATION RATE: 18 BRPM | SYSTOLIC BLOOD PRESSURE: 175 MMHG | HEIGHT: 65 IN | OXYGEN SATURATION: 99 % | HEART RATE: 82 BPM | DIASTOLIC BLOOD PRESSURE: 123 MMHG

## 2023-09-04 DIAGNOSIS — R51.9 HEADACHE, UNSPECIFIED: ICD-10-CM

## 2023-09-04 DIAGNOSIS — Z91.048 OTHER NONMEDICINAL SUBSTANCE ALLERGY STATUS: ICD-10-CM

## 2023-09-04 LAB
ALBUMIN SERPL ELPH-MCNC: 4.6 G/DL — SIGNIFICANT CHANGE UP (ref 3.3–5)
ALP SERPL-CCNC: 77 U/L — SIGNIFICANT CHANGE UP (ref 40–120)
ALT FLD-CCNC: 45 U/L — SIGNIFICANT CHANGE UP (ref 10–45)
ANION GAP SERPL CALC-SCNC: 13 MMOL/L — SIGNIFICANT CHANGE UP (ref 5–17)
APPEARANCE UR: CLEAR — SIGNIFICANT CHANGE UP
AST SERPL-CCNC: 29 U/L — SIGNIFICANT CHANGE UP (ref 10–40)
BACTERIA # UR AUTO: PRESENT /HPF
BASOPHILS # BLD AUTO: 0.05 K/UL — SIGNIFICANT CHANGE UP (ref 0–0.2)
BASOPHILS NFR BLD AUTO: 0.6 % — SIGNIFICANT CHANGE UP (ref 0–2)
BILIRUB SERPL-MCNC: 0.4 MG/DL — SIGNIFICANT CHANGE UP (ref 0.2–1.2)
BILIRUB UR-MCNC: NEGATIVE — SIGNIFICANT CHANGE UP
BUN SERPL-MCNC: 24 MG/DL — HIGH (ref 7–23)
CALCIUM SERPL-MCNC: 10.1 MG/DL — SIGNIFICANT CHANGE UP (ref 8.4–10.5)
CHLORIDE SERPL-SCNC: 101 MMOL/L — SIGNIFICANT CHANGE UP (ref 96–108)
CO2 SERPL-SCNC: 26 MMOL/L — SIGNIFICANT CHANGE UP (ref 22–31)
COLOR SPEC: YELLOW — SIGNIFICANT CHANGE UP
COMMENT - URINE: SIGNIFICANT CHANGE UP
CREAT SERPL-MCNC: 0.76 MG/DL — SIGNIFICANT CHANGE UP (ref 0.5–1.3)
DIFF PNL FLD: ABNORMAL
EGFR: 107 ML/MIN/1.73M2 — SIGNIFICANT CHANGE UP
EOSINOPHIL # BLD AUTO: 0.17 K/UL — SIGNIFICANT CHANGE UP (ref 0–0.5)
EOSINOPHIL NFR BLD AUTO: 2.1 % — SIGNIFICANT CHANGE UP (ref 0–6)
EPI CELLS # UR: ABNORMAL /HPF (ref 0–5)
GLUCOSE SERPL-MCNC: 101 MG/DL — HIGH (ref 70–99)
GLUCOSE UR QL: NEGATIVE — SIGNIFICANT CHANGE UP
HCT VFR BLD CALC: 38.8 % — SIGNIFICANT CHANGE UP (ref 34.5–45)
HGB BLD-MCNC: 12.8 G/DL — SIGNIFICANT CHANGE UP (ref 11.5–15.5)
IMM GRANULOCYTES NFR BLD AUTO: 0.2 % — SIGNIFICANT CHANGE UP (ref 0–0.9)
KETONES UR-MCNC: NEGATIVE — SIGNIFICANT CHANGE UP
LDH SERPL L TO P-CCNC: 160 U/L — SIGNIFICANT CHANGE UP (ref 50–242)
LEUKOCYTE ESTERASE UR-ACNC: ABNORMAL
LYMPHOCYTES # BLD AUTO: 2.27 K/UL — SIGNIFICANT CHANGE UP (ref 1–3.3)
LYMPHOCYTES # BLD AUTO: 27.5 % — SIGNIFICANT CHANGE UP (ref 13–44)
MCHC RBC-ENTMCNC: 28.6 PG — SIGNIFICANT CHANGE UP (ref 27–34)
MCHC RBC-ENTMCNC: 33 GM/DL — SIGNIFICANT CHANGE UP (ref 32–36)
MCV RBC AUTO: 86.6 FL — SIGNIFICANT CHANGE UP (ref 80–100)
MONOCYTES # BLD AUTO: 0.5 K/UL — SIGNIFICANT CHANGE UP (ref 0–0.9)
MONOCYTES NFR BLD AUTO: 6.1 % — SIGNIFICANT CHANGE UP (ref 2–14)
NEUTROPHILS # BLD AUTO: 5.25 K/UL — SIGNIFICANT CHANGE UP (ref 1.8–7.4)
NEUTROPHILS NFR BLD AUTO: 63.5 % — SIGNIFICANT CHANGE UP (ref 43–77)
NITRITE UR-MCNC: NEGATIVE — SIGNIFICANT CHANGE UP
NRBC # BLD: 0 /100 WBCS — SIGNIFICANT CHANGE UP (ref 0–0)
PH UR: 5.5 — SIGNIFICANT CHANGE UP (ref 5–8)
PLATELET # BLD AUTO: 277 K/UL — SIGNIFICANT CHANGE UP (ref 150–400)
POTASSIUM SERPL-MCNC: 4 MMOL/L — SIGNIFICANT CHANGE UP (ref 3.5–5.3)
POTASSIUM SERPL-SCNC: 4 MMOL/L — SIGNIFICANT CHANGE UP (ref 3.5–5.3)
PROT SERPL-MCNC: 8.1 G/DL — SIGNIFICANT CHANGE UP (ref 6–8.3)
PROT UR-MCNC: NEGATIVE MG/DL — SIGNIFICANT CHANGE UP
RBC # BLD: 4.48 M/UL — SIGNIFICANT CHANGE UP (ref 3.8–5.2)
RBC # FLD: 12.6 % — SIGNIFICANT CHANGE UP (ref 10.3–14.5)
RBC CASTS # UR COMP ASSIST: > 10 /HPF
SODIUM SERPL-SCNC: 140 MMOL/L — SIGNIFICANT CHANGE UP (ref 135–145)
SP GR SPEC: <=1.005 — SIGNIFICANT CHANGE UP (ref 1–1.03)
URATE SERPL-MCNC: 5.6 MG/DL — SIGNIFICANT CHANGE UP (ref 2.5–7)
UROBILINOGEN FLD QL: 0.2 E.U./DL — SIGNIFICANT CHANGE UP
WBC # BLD: 8.26 K/UL — SIGNIFICANT CHANGE UP (ref 3.8–10.5)
WBC # FLD AUTO: 8.26 K/UL — SIGNIFICANT CHANGE UP (ref 3.8–10.5)
WBC UR QL: > 10 /HPF

## 2023-09-04 PROCEDURE — 99284 EMERGENCY DEPT VISIT MOD MDM: CPT | Mod: 25

## 2023-09-04 PROCEDURE — 93005 ELECTROCARDIOGRAM TRACING: CPT

## 2023-09-04 PROCEDURE — 83615 LACTATE (LD) (LDH) ENZYME: CPT

## 2023-09-04 PROCEDURE — 36415 COLL VENOUS BLD VENIPUNCTURE: CPT

## 2023-09-04 PROCEDURE — 99285 EMERGENCY DEPT VISIT HI MDM: CPT

## 2023-09-04 PROCEDURE — 85025 COMPLETE CBC W/AUTO DIFF WBC: CPT

## 2023-09-04 PROCEDURE — 80053 COMPREHEN METABOLIC PANEL: CPT

## 2023-09-04 PROCEDURE — 84550 ASSAY OF BLOOD/URIC ACID: CPT

## 2023-09-04 PROCEDURE — 87086 URINE CULTURE/COLONY COUNT: CPT

## 2023-09-04 PROCEDURE — 81001 URINALYSIS AUTO W/SCOPE: CPT

## 2023-09-04 RX ORDER — LABETALOL HCL 100 MG
10 TABLET ORAL ONCE
Refills: 0 | Status: DISCONTINUED | OUTPATIENT
Start: 2023-09-04 | End: 2023-09-04

## 2023-09-04 RX ORDER — NIFEDIPINE 30 MG
1 TABLET, EXTENDED RELEASE 24 HR ORAL
Qty: 14 | Refills: 0
Start: 2023-09-04 | End: 2023-09-17

## 2023-09-04 NOTE — ED PROVIDER NOTE - OBJECTIVE STATEMENT
room air
32yF s/p C/S 7/14 for severe PEC, had 2 week post-partum hospital stay for BP control (says she had some HTN before pregnancy but it got a lot worse during), comes in for elevated BP readings. States she was d/c on regimen labetalol 500mg q8h, enalapril 10mg daily, nifedipine 30mg daily and her Bp was controlled. Is following w/ cardiology and has been weaning her BP meds starting w/ the labetalol dosing. A few days ago they d/c her labetalol and nifedipine, kept her on the enalapril and added amlodipine 5mg. Pt states since these medication changes she has had elevated BP readings. Tonight she had multiple readings of SBP 150s and diastolic 100-110, with associated mild headache, no scotoma floaters or vision changes. Took 2 extra doses of labetalol 100mg.

## 2023-09-04 NOTE — ED PROVIDER NOTE - CLINICAL SUMMARY MEDICAL DECISION MAKING FREE TEXT BOX
Pt hypertensive in triage 175/105, asymptomatic  discussed w/ OBGYN team, pt is 7 weeks 2 days post-partum and therefore out of the PEC window (up to 6 weeks post partum)  labs ok, UA contaminated but pt w/o urinary symptoms so will not treat, will f/u culture  BP readings here improved, ranges from 125 systolic to 159 systolic so will not give further anti-HTN meds  prolonged conversation w/ patient regarding BP management etc  will go back to former regimen with some modification, pt will call her cardiologist in AM to f/u    No further indication for emergent or inpatient workup, pt stable and ready for discharge. Results, diagnosis and post-discharge plan including appropriate outpatient follow up were discussed and all questions answered Pt hypertensive in triage 175/123, asymptomatic  discussed w/ OBGYN team, pt is 7 weeks 2 days post-partum and therefore out of the PEC window (up to 6 weeks post partum)  labs ok, UA contaminated but pt w/o urinary symptoms so will not treat, will f/u culture  BP readings here improved, ranges from 125 systolic to 159 systolic so will not give further anti-HTN meds  prolonged conversation w/ patient regarding BP management etc  will go back to former regimen with some modification, pt will call her cardiologist in AM to f/u    No further indication for emergent or inpatient workup, pt stable and ready for discharge. Results, diagnosis and post-discharge plan including appropriate outpatient follow up were discussed and all questions answered

## 2023-09-04 NOTE — CONSULT NOTE ADULT - ASSESSMENT
33 yo  POD 52 from primary c section c/b PEC w/ SF (s/p treatment) presenting with elevated BPs >150s in the setting of downtitrating medications outpatient. Patient asymptmoatic at this time and healing well from postpartum/post op perspective. Initial BP in emergency room severe range (175/123), which is now resolved to 135/84 with no intervention (other than patient taking additional labetalol 100 at home prior to coming to ED).   - F/u full labs to r/o worsening effects of preeclampsia on other liver/kidney  - Likely admit to postpartum unit to titrate medications for improved BP control    ****PRELIM PENDING DISCUSSION WITH ATTENIDNG*** 31 yo  POD 52 from primary c section c/b PEC w/ SF (s/p treatment) presenting with elevated BPs >150s in the setting of downtitrating medications outpatient. Patient asymptmoatic at this time and healing well from postpartum/post op perspective. Initial BP in emergency room severe range (175/123), which is now resolved to 135/84 with no intervention (other than patient taking additional labetalol 100 at home prior to coming to ED).   - Unlikely to be obstetric related given >6 weeks postpartum. No obstetric complaints at this time.  - F/u full labs to r/o worsening effects of preeclampsia on other liver/kidney  - Please consult cardiology given they are managing her BP medications outpatient (and were managing inpatient as well) to recommend regimen for home    Debra PGY2 discussed with Jose PGY4 and Dr. Riddle 31 yo  POD 52 from primary c section c/b siPEC w/ SF (s/p IV Mg treatment) presenting with elevated BPs >150s in the setting of downtitrating medications outpatient. Patient asymptomatic at this time and healing well from postpartum/post op perspective. Initial BP in emergency room severe range (175/123), which is now resolved to 135/84 with no intervention (other than patient taking additional labetalol 100 at home prior to coming to ED).   - Unlikely to be obstetric related given >6 weeks postpartum in the setting of known chronic hypertension being management outpatient by her Cardiologist. No obstetric complaints at this time.  - F/u full labs   - Please consult cardiology given they are managing her BP medications outpatient (and were managing inpatient as well) to recommend regimen for home    Debra PGY2 discussed with Jose PGY4 and Dr. Riddle

## 2023-09-04 NOTE — CONSULT NOTE ADULT - SUBJECTIVE AND OBJECTIVE BOX
31 yo  s/p pCS on  for failed IOL initially induced for cHTN ultimately ruled in for preeclampsia with severe features BPs. Patient was treated with IV Magnesium from - and was ultimately discharged to home on labetalol 500 TID, Enalapril 10 QD, and nifedipine 30 QD. Delivery was further complicated by chorioamnionitis, for which patient was treated with IV Amp/Gent/Clinda, and mild uterine atony s/p intra-op B balbuena suture. Patient presenting from home with elevated BPs after cardiologist (Dr. Moss) downtitrated medications this week.     On , labetalol dose decreased to 200 TID. Patient reports this worked well and there were occasions when she would need to skip labetalol dose due to BPs <110/60.     On , medications further changed to the following: Labetalol dose decreased from 200 TID to 100 TID, nifedipine was stopped and instead amlodipine 5mg QD was started. Enalapril 10 QD continued. Initially this regimen worked well, BPs were primarily in the 130s systolic. Yesterday, she began to have elevated BPs as high as 150s/105 and 148/102. Today at 7pm, she had 159/111, repeat was 158/108, accompanied with an 8/10 bitemporal headache. She took tylenol, an extra dose of labetalol 100 STAT, and her headache is now "faint".     Patient denies scotoma, RUQ pain, SOB. Patient otherwise feels well. From post-op perspective, she is healing well. Pain well controlled, no concerning vaginal bleeding/lochia, no fevers/chills. Patient is voiding and ambulating without difficulty.    Prenatal care was with [facility].  Patient is breastfeeding.    PMHx: Denies  SHx: c section   Meds: Labetalol 100 TID, amlodipine 5 QD, enalapril 10 QD  Allergies: NKDA    PHYSICAL EXAM:   Vital Signs Last 24 Hrs  T(C): 36.6 (04 Sep 2023 21:11), Max: 36.6 (04 Sep 2023 21:11)  T(F): 97.9 (04 Sep 2023 21:11), Max: 97.9 (04 Sep 2023 21:11)  HR: 73 (04 Sep 2023 22:08) (73 - 82)  BP: 135/84 (04 Sep 2023 22:08) (135/84 - 175/123)  BP(mean): --  RR: 16 (04 Sep 2023 22:08) (16 - 18)  SpO2: 99% (04 Sep 2023 22:08) (98% - 99%)    Parameters below as of 04 Sep 2023 22:08  Patient On (Oxygen Delivery Method): room air        **************************  Gen: Alert, NAD  Abd: Soft, nontender, incision healing well, dry and intact  Ext: 1+ brachioradialis reflexes; no calf swelling or tenderness      LABS:                        12.8   8.26  )-----------( 277      ( 04 Sep 2023 22:16 )             38.8              31 yo  s/p pCS on  for failed IOL initially induced for cHTN ultimately ruled in for si-preeclampsia with severe features BPs. Patient was treated with IV Magnesium from - and was ultimately discharged to home on labetalol 500 TID, Enalapril 10 QD, and nifedipine 30 QD with inpatient cardiology consult. Delivery was further complicated by chorioamnionitis, for which patient was treated with IV Amp/Gent/Clinda, and mild uterine atony s/p intra-op B balbuena suture. Patient presenting from home with elevated BPs after cardiologist (Dr. Moss) downtitrated medications this week.     On , labetalol dose decreased to 200 TID. Patient reports this worked well and there were occasions when she would need to skip labetalol dose due to BPs <110/60.     Patient received TTE :   1. Moderately dilated left ventricular cavity size. Left ventricular wall thickness is mildly increased. Left ventricular systolic function is normal with an ejection fraction of 59 % by Elder's method of disks.  2. There is increased LV mass and eccentric hypertrophy.  3. Normal right ventricular cavity size and normal right ventricular wall thickness.  4. Normal atria.  5. Structurally normal mitral valve with normal leaflet excursion.  6. The aortic valve is tricuspid with normal structure without stenosis with normal systolic excursion.  7. Mild tricuspid regurgitation.  8. Estimated pulmonary artery systolic pressure is 29 mmHg.  9. No pericardial effusion seen.  10. No echocardiographic evidence of pulmonary hypertension.    On , medications further changed to the following: Labetalol dose decreased from 200 TID to 100 TID, nifedipine was stopped and instead amlodipine 5mg QD was started. Enalapril 10 QD continued. Initially this regimen worked well, BPs were primarily in the 130s systolic. Yesterday, she began to have elevated BPs as high as 150s/105 and 148/102. Today at 7pm, she had 159/111, repeat was 158/108, accompanied with an 8/10 bitemporal headache. She took tylenol, an extra dose of labetalol 100 STAT, and her headache is now "faint".     Patient denies scotoma, RUQ pain, SOB. Patient otherwise feels well. From post-op perspective, she is healing well. Pain well controlled, no concerning vaginal bleeding/lochia, no fevers/chills. Patient is voiding and ambulating without difficulty.    Prenatal care was with [facility].  Patient is breastfeeding.    PMHx: Denies  SHx: c section   Meds: Labetalol 100 TID, amlodipine 5 QD, enalapril 10 QD  Allergies: NKDA    PHYSICAL EXAM:   Vital Signs Last 24 Hrs  T(C): 36.6 (04 Sep 2023 21:11), Max: 36.6 (04 Sep 2023 21:11)  T(F): 97.9 (04 Sep 2023 21:11), Max: 97.9 (04 Sep 2023 21:11)  HR: 73 (04 Sep 2023 22:08) (73 - 82)  BP: 135/84 (04 Sep 2023 22:08) (135/84 - 175/123)  BP(mean): --  RR: 16 (04 Sep 2023 22:08) (16 - 18)  SpO2: 99% (04 Sep 2023 22:08) (98% - 99%)    Parameters below as of 04 Sep 2023 22:08  Patient On (Oxygen Delivery Method): room air        **************************  Gen: Alert, NAD  Abd: Soft, nontender, incision healing well, dry and intact  Ext: 1+ brachioradialis reflexes; no calf swelling or tenderness      LABS:                        12.8   8.26  )-----------( 277      ( 04 Sep 2023 22:16 )             38.8

## 2023-09-04 NOTE — ED PROVIDER NOTE - CARE PLAN
1 Principal Discharge DX:	Elevated blood pressure reading with diagnosis of hypertension  Secondary Diagnosis:	Postpartum hypertension

## 2023-09-04 NOTE — ED PROVIDER NOTE - BIRTH SEX
Female
Pt h/o bipolar disorder, presents covid + since yesterday, has not been able to take her medication, appeared agitated due to not receiving medication, denies SI/HI, no hallucinations, calm and cooperative.

## 2023-09-04 NOTE — ED ADULT NURSE NOTE - NSFALLUNIVINTERV_ED_ALL_ED
Bed/Stretcher in lowest position, wheels locked, appropriate side rails in place/Call bell, personal items and telephone in reach/Instruct patient to call for assistance before getting out of bed/chair/stretcher/Non-slip footwear applied when patient is off stretcher/[place] to call system/Physically safe environment - no spills, clutter or unnecessary equipment/Purposeful proactive rounding/Room/bathroom lighting operational, light cord in reach

## 2023-09-04 NOTE — ED ADULT NURSE NOTE - OBJECTIVE STATEMENT
Patient ot the ED c/o HTN with headache x 1 day. States she is 7 weeks post-partum with hx of pre-eclampsia. Took 100 mg labetolol x 2 with other prescribed BP meds today, last labetolol @ 1930. C/O headache without vision changes, patient crying. BEFAST-, AAOX4, NAD.

## 2023-09-04 NOTE — ED ADULT TRIAGE NOTE - CHIEF COMPLAINT QUOTE
Pt, with hx of pre-eclampsia (7 wks postpartum), presents to ER c/o constant temporal "throbbing 6/10" headache with elevated blood pressure (171/109 @ home) since ~1900 today. Pt reports taking  tylenol 650mg pta. Pt denies any other associated symptoms at this time. Pt reports taking labetolol 100mg @1200 and 1930

## 2023-09-04 NOTE — ED PROVIDER NOTE - NSFOLLOWUPINSTRUCTIONS_ED_ALL_ED_FT
Switch back to your prior BP regimen- labetalol 100mg every 8 hours, enalapril 10mg daily, nifedipine 30mg daily. Take your BP 3x/day and keep it in a BP journal. If your blood pressure is very elevated, try to relax and check it another 2 times. If it stays elevated, you can take an extra 100mg labetalol  Call your cardiologist and OB to touch base this week and for clearance before your trip next week.

## 2023-09-05 VITALS
SYSTOLIC BLOOD PRESSURE: 117 MMHG | OXYGEN SATURATION: 98 % | HEART RATE: 62 BPM | RESPIRATION RATE: 16 BRPM | DIASTOLIC BLOOD PRESSURE: 62 MMHG

## 2023-09-06 LAB
CULTURE RESULTS: SIGNIFICANT CHANGE UP
SPECIMEN SOURCE: SIGNIFICANT CHANGE UP

## 2023-09-07 ENCOUNTER — NON-APPOINTMENT (OUTPATIENT)
Age: 33
End: 2023-09-07

## 2023-09-21 ENCOUNTER — APPOINTMENT (OUTPATIENT)
Dept: HEART AND VASCULAR | Facility: CLINIC | Age: 33
End: 2023-09-21
Payer: COMMERCIAL

## 2023-09-21 VITALS
HEART RATE: 69 BPM | TEMPERATURE: 98.1 F | HEIGHT: 65 IN | BODY MASS INDEX: 33.32 KG/M2 | OXYGEN SATURATION: 98 % | DIASTOLIC BLOOD PRESSURE: 86 MMHG | SYSTOLIC BLOOD PRESSURE: 127 MMHG | WEIGHT: 200 LBS

## 2023-09-21 PROCEDURE — 99214 OFFICE O/P EST MOD 30 MIN: CPT | Mod: 25

## 2023-09-21 RX ORDER — LABETALOL HYDROCHLORIDE 200 MG/1
200 TABLET, FILM COATED ORAL 3 TIMES DAILY
Qty: 90 | Refills: 3 | Status: DISCONTINUED | COMMUNITY
End: 2023-09-21

## 2023-10-09 ENCOUNTER — APPOINTMENT (OUTPATIENT)
Dept: HEART AND VASCULAR | Facility: CLINIC | Age: 33
End: 2023-10-09
Payer: COMMERCIAL

## 2023-10-09 VITALS
TEMPERATURE: 98 F | DIASTOLIC BLOOD PRESSURE: 89 MMHG | HEART RATE: 76 BPM | HEIGHT: 65 IN | WEIGHT: 197 LBS | SYSTOLIC BLOOD PRESSURE: 152 MMHG | BODY MASS INDEX: 32.82 KG/M2 | OXYGEN SATURATION: 98 %

## 2023-10-09 VITALS — SYSTOLIC BLOOD PRESSURE: 148 MMHG | DIASTOLIC BLOOD PRESSURE: 96 MMHG

## 2023-10-09 PROCEDURE — 99215 OFFICE O/P EST HI 40 MIN: CPT | Mod: 25

## 2023-10-09 PROCEDURE — 99205 OFFICE O/P NEW HI 60 MIN: CPT | Mod: 25

## 2023-10-12 NOTE — LACTATION INITIAL EVALUATION - LIVING CHILDREN, OB PROFILE
0 Bed/Stretcher in lowest position, wheels locked, appropriate side rails in place/Call bell, personal items and telephone in reach/Instruct patient to call for assistance before getting out of bed/chair/stretcher/Non-slip footwear applied when patient is off stretcher/[place] to call system/Physically safe environment - no spills, clutter or unnecessary equipment/Purposeful proactive rounding/Room/bathroom lighting operational, light cord in reach

## 2023-12-04 ENCOUNTER — APPOINTMENT (OUTPATIENT)
Dept: HEART AND VASCULAR | Facility: CLINIC | Age: 33
End: 2023-12-04
Payer: COMMERCIAL

## 2023-12-04 VITALS
BODY MASS INDEX: 32.15 KG/M2 | SYSTOLIC BLOOD PRESSURE: 134 MMHG | HEIGHT: 65 IN | OXYGEN SATURATION: 97 % | DIASTOLIC BLOOD PRESSURE: 78 MMHG | WEIGHT: 193 LBS | HEART RATE: 75 BPM | TEMPERATURE: 98 F

## 2023-12-04 VITALS — DIASTOLIC BLOOD PRESSURE: 76 MMHG | SYSTOLIC BLOOD PRESSURE: 122 MMHG

## 2023-12-04 PROCEDURE — 99214 OFFICE O/P EST MOD 30 MIN: CPT

## 2023-12-04 RX ORDER — AMLODIPINE BESYLATE 5 MG/1
5 TABLET ORAL DAILY
Qty: 30 | Refills: 2 | Status: DISCONTINUED | COMMUNITY
Start: 2023-08-29 | End: 2023-12-04

## 2023-12-18 ENCOUNTER — RX RENEWAL (OUTPATIENT)
Age: 33
End: 2023-12-18

## 2024-02-15 ENCOUNTER — RX RENEWAL (OUTPATIENT)
Age: 34
End: 2024-02-15

## 2024-03-07 NOTE — PATIENT PROFILE OB - BILL OF RIGHTS/ADMISSION INFORMATION PROVIDED TO:
Need for prophylactic measure Need for prophylactic measure Need for prophylactic measure Cataract Need for prophylactic measure Need for prophylactic measure Need for prophylactic measure Need for prophylactic measure Need for prophylactic measure Need for prophylactic measure Need for prophylactic measure Cataract Need for prophylactic measure Need for prophylactic measure Cataract Cataract Need for prophylactic measure Need for prophylactic measure Patient

## 2024-03-09 ENCOUNTER — RX RENEWAL (OUTPATIENT)
Age: 34
End: 2024-03-09

## 2024-03-09 RX ORDER — NIFEDIPINE 30 MG/1
30 TABLET, FILM COATED, EXTENDED RELEASE ORAL DAILY
Qty: 30 | Refills: 5 | Status: ACTIVE | COMMUNITY
Start: 2024-03-09 | End: 1900-01-01

## 2024-04-28 ENCOUNTER — NON-APPOINTMENT (OUTPATIENT)
Age: 34
End: 2024-04-28

## 2024-04-29 ENCOUNTER — NON-APPOINTMENT (OUTPATIENT)
Age: 34
End: 2024-04-29

## 2024-04-30 ENCOUNTER — TRANSCRIPTION ENCOUNTER (OUTPATIENT)
Age: 34
End: 2024-04-30

## 2024-05-06 ENCOUNTER — APPOINTMENT (OUTPATIENT)
Dept: HEART AND VASCULAR | Facility: CLINIC | Age: 34
End: 2024-05-06
Payer: COMMERCIAL

## 2024-05-06 PROCEDURE — 99443: CPT

## 2024-05-06 NOTE — HISTORY OF PRESENT ILLNESS
[FreeTextEntry1] : 34 y/o female with h/o htn, obesity, post partum 7/14/23 who presents for f/up today via telephone  last seen 12/23  notes she is struggling to lose weight  currently 10 months post partum   bp's well controlled never did rocky, maria victoria eval or lab w/up for htn  no cp, sob, syncope, lh, palpitations, edema, orthopnea, pnd   -Echo 8/23 1. Moderately dilated left ventricular cavity size. Left ventricular wall thickness is mildly increased. Left ventricular systolic function is normal with an ejection fraction of 59 % by Elder's method of disks. 2. There is increased LV mass and eccentric hypertrophy. 3. Normal right ventricular cavity size and normal right ventricular wall thickness. 4. Normal atria. 5. Structurally normal mitral valve with normal leaflet excursion. 6. The aortic valve is tricuspid with normal structure without stenosis with normal systolic excursion. 7. Mild tricuspid regurgitation. 8. Estimated pulmonary artery systolic pressure is 29 mmHg. 9. No pericardial effusion seen. 10. No echocardiographic evidence of pulmonary hypertension.   h/o htn - prior to pregnancy during pregnancy - bp monitored 120/80's until 3rd trimester at 38 weeks/5 days - bp elevated to 160/110 induced - was on hydral/mag kept in hospital 11 days post partum started on bp meds   diagnosed w chorioamnionitis - got 24 hr iv abx    dc'd on labetalol 500 mg tid and nifedipine 30 mg qd and enalapril 10 mg qd  father diagnosed w htn 28  walks for exercise diet healthy monitoring salt  h/o snoring  breastfeeding  PMH/PSH: htn c section chorioamnionitis obesity   ALL: nkda  MEDS: coreg 12.5 mg bid nifedipine 30 mg qd enalapril 10 mg qd    SH: no tobacco social etoh no drugs  works in [place] from [employer]    FH: mother - alive, dm, 67 father - htn, dm, alive, 67

## 2024-05-06 NOTE — DISCUSSION/SUMMARY
[Patient] : the patient [___ Month(s)] : in [unfilled] month(s) [FreeTextEntry1] : 34 y/o female with h/o htn, obesity, post partum 7/14/23 who presents for f/up today via telephone   -will refer to [facility] for weight loss drugs -continue coreg 12.5 mg bid -continue enalapril, nifedipine -ekg 8/23 - nsr, normal intervals, no st/t changes -bp close monitoring -secondary w/up htn ordered today including labs, Renal duplex, maria victoria eval -will repeat echo for LV size - can consider CV MRI -Echo 8/23 1. Moderately dilated left ventricular cavity size. Left ventricular wall thickness is mildly increased. Left ventricular systolic function is normal with an ejection fraction of 59 % by Elder's method of disks. 2. There is increased LV mass and eccentric hypertrophy. 3. Normal right ventricular cavity size and normal right ventricular wall thickness. 4. Normal atria. 5. Structurally normal mitral valve with normal leaflet excursion. 6. The aortic valve is tricuspid with normal structure without stenosis with normal systolic excursion. 7. Mild tricuspid regurgitation. 8. Estimated pulmonary artery systolic pressure is 29 mmHg. 9. No pericardial effusion seen. 10. No echocardiographic evidence of pulmonary hypertension. -counseled on cvd risk factors -f/up 2 months for htn   I have spent 30 minutes reviewing labs, records, tests and discussed htn, cvd risk factors.

## 2024-05-07 ENCOUNTER — APPOINTMENT (OUTPATIENT)
Dept: HEART AND VASCULAR | Facility: CLINIC | Age: 34
End: 2024-05-07
Payer: COMMERCIAL

## 2024-05-07 ENCOUNTER — APPOINTMENT (OUTPATIENT)
Dept: HEART AND VASCULAR | Facility: CLINIC | Age: 34
End: 2024-05-07

## 2024-05-07 DIAGNOSIS — E66.9 OBESITY, UNSPECIFIED: ICD-10-CM

## 2024-05-07 PROCEDURE — 99443: CPT

## 2024-05-07 RX ORDER — TIRZEPATIDE 2.5 MG/.5ML
2.5 INJECTION, SOLUTION SUBCUTANEOUS
Qty: 1 | Refills: 5 | Status: ACTIVE | COMMUNITY
Start: 2024-05-07 | End: 1900-01-01

## 2024-05-13 ENCOUNTER — RX RENEWAL (OUTPATIENT)
Age: 34
End: 2024-05-13

## 2024-05-13 ENCOUNTER — APPOINTMENT (OUTPATIENT)
Dept: HEART AND VASCULAR | Facility: CLINIC | Age: 34
End: 2024-05-13
Payer: COMMERCIAL

## 2024-05-13 VITALS
BODY MASS INDEX: 32.15 KG/M2 | HEIGHT: 65 IN | DIASTOLIC BLOOD PRESSURE: 98 MMHG | SYSTOLIC BLOOD PRESSURE: 145 MMHG | OXYGEN SATURATION: 98 % | WEIGHT: 193 LBS | HEART RATE: 85 BPM

## 2024-05-13 PROCEDURE — 93306 TTE W/DOPPLER COMPLETE: CPT

## 2024-05-13 RX ORDER — CARVEDILOL 12.5 MG/1
12.5 TABLET, FILM COATED ORAL
Qty: 60 | Refills: 2 | Status: ACTIVE | COMMUNITY
Start: 2023-09-21 | End: 1900-01-01

## 2024-06-18 ENCOUNTER — TRANSCRIPTION ENCOUNTER (OUTPATIENT)
Age: 34
End: 2024-06-18

## 2024-06-25 ENCOUNTER — RX RENEWAL (OUTPATIENT)
Age: 34
End: 2024-06-25

## 2024-06-25 RX ORDER — ENALAPRIL MALEATE 10 MG/1
10 TABLET ORAL
Qty: 90 | Refills: 1 | Status: ACTIVE | COMMUNITY
Start: 2024-06-25 | End: 1900-01-01

## 2024-09-06 NOTE — PROGRESS NOTE ADULT - SUBJECTIVE AND OBJECTIVE BOX
Patient evaluated at bedside for clinical magnesium check. Serum magnesium to be drawn at 0730, 1330.  She denies visual disturbances including scotoma, headache, and right upper quadrant pain. Also denies nausea/vomiting/epigastric pain/shortness of breath. She is experiencing a headache rated 6/10 intensity, just received Motrin, and is planning to eat breakfast soon, which she thinks will help.     T(C): 36.7 (07-21-23 @ 08:00), Max: 37 (07-21-23 @ 02:25)  HR: 72 (07-21-23 @ 08:00) (72 - 86)  BP: 118/83 (07-21-23 @ 08:00) (117/74 - 134/87)  RR: 17 (07-21-23 @ 08:00) (17 - 18)  SpO2: 98% (07-21-23 @ 08:00) (97% - 98%)  Wt(kg): --  Daily     Daily     07-20 @ 07:01  -  07-21 @ 07:00  --------------------------------------------------------  IN: 1450 mL / OUT: 1800 mL / NET: -350 mL      Gen: no apparent distress  CV: regular rate and rhythm; no murmurs  Pulm: no increased work of breathing; clear to auscultation bilaterally  Abd: soft, nontender, no rebound or guarding, no epigastric tenderness, liver nonpalpable +BS, fundus palpated   : Lazo in place  Ext: trace pedal edema bilaterally; Reflexes 2+ and symmetric diffusely                          9.2    9.53  )-----------( 257      ( 21 Jul 2023 01:22 )             27.4     07-21    142  |  110<H>  |  14  ----------------------------<  104<H>  4.0   |  22  |  0.51    Ca    7.4<L>      21 Jul 2023 01:22  Mg     4.4     07-21    TPro  6.0  /  Alb  3.1<L>  /  TBili  <0.2  /  DBili  x   /  AST  11  /  ALT  11  /  AlkPhos  75  07-21    acetaminophen     Tablet .. 975 milliGRAM(s) Oral <User Schedule>  diphenhydrAMINE 25 milliGRAM(s) Oral every 6 hours PRN  enalapril 10 milliGRAM(s) Oral every 24 hours  enoxaparin Injectable 40 milliGRAM(s) SubCutaneous every 24 hours  furosemide    Tablet 20 milliGRAM(s) Oral daily  ibuprofen  Tablet. 600 milliGRAM(s) Oral every 6 hours  labetalol 400 milliGRAM(s) Oral three times a day  lactated ringers. 1000 milliLiter(s) IV Continuous <Continuous>  lanolin Ointment 1 Application(s) Topical every 6 hours PRN  magnesium hydroxide Suspension 30 milliLiter(s) Oral two times a day PRN  magnesium sulfate Infusion 2 Gm/Hr IV Continuous <Continuous>  oxyCODONE    IR 5 milliGRAM(s) Oral every 3 hours PRN  oxyCODONE    IR 5 milliGRAM(s) Oral once PRN  oxytocin Infusion 333.333 milliUNIT(s)/Min IV Continuous <Continuous>  simethicone 80 milliGRAM(s) Chew every 4 hours PRN  zinc oxide 20% Ointment 1 Application(s) Topical daily      07-20-23 @ 07:01  -  07-21-23 @ 07:00  --------------------------------------------------------  IN: 1450 mL / OUT: 1800 mL / NET: -350 mL                     Patient evaluated at bedside for clinical magnesium check. Serum magnesium to be drawn at 0730, 1330.  She denies visual disturbances including scotoma, headache, and right upper quadrant pain. Also denies nausea/vomiting/epigastric pain/shortness of breath. She is experiencing a headache rated 6/10 intensity, just received Motrin, and is planning to eat breakfast soon, which she thinks will help.     T(C): 36.7 (07-21-23 @ 08:00), Max: 37 (07-21-23 @ 02:25)  HR: 72 (07-21-23 @ 08:00) (72 - 86)  BP: 118/83 (07-21-23 @ 08:00) (117/74 - 134/87)  RR: 17 (07-21-23 @ 08:00) (17 - 18)  SpO2: 98% (07-21-23 @ 08:00) (97% - 98%)  Wt(kg): --  Daily     Daily     07-20 @ 07:01  -  07-21 @ 07:00  --------------------------------------------------------  IN: 1450 mL / OUT: 1800 mL / NET: -350 mL      Gen: no apparent distress  CV: regular rate and rhythm; no murmurs  Pulm: no increased work of breathing; clear to auscultation bilaterally  Abd: soft, nontender, no rebound or guarding, no epigastric tenderness, liver nonpalpable +BS, fundus palpated   Ext: trace pedal edema bilaterally; Reflexes 2+ and symmetric diffusely                          9.2    9.53  )-----------( 257      ( 21 Jul 2023 01:22 )             27.4     07-21    142  |  110<H>  |  14  ----------------------------<  104<H>  4.0   |  22  |  0.51    Ca    7.4<L>      21 Jul 2023 01:22  Mg     4.4     07-21    TPro  6.0  /  Alb  3.1<L>  /  TBili  <0.2  /  DBili  x   /  AST  11  /  ALT  11  /  AlkPhos  75  07-21    acetaminophen     Tablet .. 975 milliGRAM(s) Oral <User Schedule>  diphenhydrAMINE 25 milliGRAM(s) Oral every 6 hours PRN  enalapril 10 milliGRAM(s) Oral every 24 hours  enoxaparin Injectable 40 milliGRAM(s) SubCutaneous every 24 hours  furosemide    Tablet 20 milliGRAM(s) Oral daily  ibuprofen  Tablet. 600 milliGRAM(s) Oral every 6 hours  labetalol 400 milliGRAM(s) Oral three times a day  lactated ringers. 1000 milliLiter(s) IV Continuous <Continuous>  lanolin Ointment 1 Application(s) Topical every 6 hours PRN  magnesium hydroxide Suspension 30 milliLiter(s) Oral two times a day PRN  magnesium sulfate Infusion 2 Gm/Hr IV Continuous <Continuous>  oxyCODONE    IR 5 milliGRAM(s) Oral every 3 hours PRN  oxyCODONE    IR 5 milliGRAM(s) Oral once PRN  oxytocin Infusion 333.333 milliUNIT(s)/Min IV Continuous <Continuous>  simethicone 80 milliGRAM(s) Chew every 4 hours PRN  zinc oxide 20% Ointment 1 Application(s) Topical daily      07-20-23 @ 07:01  -  07-21-23 @ 07:00  --------------------------------------------------------  IN: 1450 mL / OUT: 1800 mL / NET: -350 mL                     74

## 2024-09-28 ENCOUNTER — RX RENEWAL (OUTPATIENT)
Age: 34
End: 2024-09-28

## 2024-12-04 ENCOUNTER — APPOINTMENT (OUTPATIENT)
Dept: HEART AND VASCULAR | Facility: CLINIC | Age: 34
End: 2024-12-04

## 2025-03-24 ENCOUNTER — RX RENEWAL (OUTPATIENT)
Age: 35
End: 2025-03-24

## 2025-03-25 ENCOUNTER — RX RENEWAL (OUTPATIENT)
Age: 35
End: 2025-03-25

## 2025-03-27 ENCOUNTER — APPOINTMENT (OUTPATIENT)
Dept: HEART AND VASCULAR | Facility: CLINIC | Age: 35
End: 2025-03-27
Payer: COMMERCIAL

## 2025-03-27 DIAGNOSIS — I10 ESSENTIAL (PRIMARY) HYPERTENSION: ICD-10-CM

## 2025-03-27 PROCEDURE — 99214 OFFICE O/P EST MOD 30 MIN: CPT | Mod: 95
